# Patient Record
Sex: MALE | Race: WHITE | NOT HISPANIC OR LATINO | Employment: FULL TIME
[De-identification: names, ages, dates, MRNs, and addresses within clinical notes are randomized per-mention and may not be internally consistent; named-entity substitution may affect disease eponyms.]

---

## 2017-02-17 DIAGNOSIS — K21.9 GASTROESOPHAGEAL REFLUX DISEASE WITHOUT ESOPHAGITIS: ICD-10-CM

## 2017-02-17 NOTE — TELEPHONE ENCOUNTER
Ranitidine      Last Written Prescription Date: 02/16/16  Last Fill Quantity: 60,  # refills: 11   Last Office Visit with G, [facility] or [facility] prescribing provider: 02/16/16 Harriet Cadena PA-C

## 2017-02-21 NOTE — TELEPHONE ENCOUNTER
Medication is being filled for 1 time refill only due to:  Patient needs to be seen because it has been more than one year since last visit.   Mar Campo RN

## 2017-03-28 ENCOUNTER — RECORDS - HEALTHEAST (OUTPATIENT)
Dept: ADMINISTRATIVE | Facility: OTHER | Age: 47
End: 2017-03-28

## 2017-04-10 DIAGNOSIS — K21.9 GASTROESOPHAGEAL REFLUX DISEASE WITHOUT ESOPHAGITIS: ICD-10-CM

## 2017-04-10 NOTE — TELEPHONE ENCOUNTER
ranitidine (ZANTAC) 150 MG tablet      Last Written Prescription Date: 2/21/17  Last Fill Quantity: 60,  # refills: 0   Last Office Visit with [facility], [facility] or [facility] prescribing provider: 2/16/17    Elsie Aguilar

## 2017-04-12 NOTE — TELEPHONE ENCOUNTER
Trupti refill was given in February, patient is due for a follow up appointment (GERD, lipids) as they have not been seen in over a year. Please call the patient and assist with scheduling then route to the provider for refill review.    KING Sutherland, Clinical RN Marii Knox.

## 2017-04-12 NOTE — TELEPHONE ENCOUNTER
This writer attempted to contact Enrike on 04/12/17.    Was call answered?  No.  Left message on voicemail with information to call me back.    If patient calls back, please Schedule appointment as soon as possible for OV and fasting labs, then route to PCP.    Annel Walters

## 2017-04-17 DIAGNOSIS — E78.5 HYPERLIPIDEMIA LDL GOAL <130: ICD-10-CM

## 2017-04-17 RX ORDER — ATORVASTATIN CALCIUM 10 MG/1
10 TABLET, FILM COATED ORAL DAILY
Qty: 30 TABLET | Refills: 0 | Status: SHIPPED | OUTPATIENT
Start: 2017-04-17 | End: 2017-04-18

## 2017-04-17 NOTE — TELEPHONE ENCOUNTER
atorvastatin (LIPITOR) 10 MG tablet     Last Written Prescription Date: 6/1/16  Last Fill Quantity: 90, # refills: 2  Last Office Visit with G, P or [facility] prescribing provider: 2/16/16 Tammi  Next 5 appointments (look out 90 days)     Apr 20, 2017  8:20 AM CDT   [facility] Visit with Lisa Bravo MD   [facility]    [address] [place] [id number]   [phone]                   Lab Results   Component Value Date    CHOL 139 04/04/2016     Lab Results   Component Value Date    HDL 45 04/04/2016     Lab Results   Component Value Date    LDL 83 04/04/2016     Lab Results   Component Value Date    TRIG 54 04/04/2016     Lab Results   Component Value Date    CHOLHDLRATIO 5.2 08/19/2015     Mariann Aguilar

## 2017-04-17 NOTE — TELEPHONE ENCOUNTER
Medication is being filled for 1 time refill only due to:  Patient needs labs FLP.   Whit Alcala RN

## 2017-04-18 RX ORDER — ATORVASTATIN CALCIUM 10 MG/1
10 TABLET, FILM COATED ORAL DAILY
Qty: 30 TABLET | Refills: 0 | Status: SHIPPED | OUTPATIENT
Start: 2017-04-18 | End: 2017-04-20

## 2017-04-20 ENCOUNTER — OFFICE VISIT (OUTPATIENT)
Dept: FAMILY MEDICINE | Facility: CLINIC | Age: 47
End: 2017-04-20
Payer: COMMERCIAL

## 2017-04-20 VITALS
HEIGHT: 74 IN | WEIGHT: 215.2 LBS | DIASTOLIC BLOOD PRESSURE: 92 MMHG | HEART RATE: 72 BPM | OXYGEN SATURATION: 98 % | BODY MASS INDEX: 27.62 KG/M2 | SYSTOLIC BLOOD PRESSURE: 110 MMHG | TEMPERATURE: 98.7 F

## 2017-04-20 DIAGNOSIS — E78.5 HYPERLIPIDEMIA LDL GOAL <130: Primary | ICD-10-CM

## 2017-04-20 DIAGNOSIS — K21.9 GASTROESOPHAGEAL REFLUX DISEASE WITHOUT ESOPHAGITIS: ICD-10-CM

## 2017-04-20 DIAGNOSIS — Z82.49 FAMILY HISTORY OF BRAIN ANEURYSM: ICD-10-CM

## 2017-04-20 LAB
ALT SERPL W P-5'-P-CCNC: 43 U/L (ref 0–70)
CHOLEST SERPL-MCNC: 140 MG/DL
GLUCOSE SERPL-MCNC: 98 MG/DL (ref 70–99)
HDLC SERPL-MCNC: 52 MG/DL
LDLC SERPL CALC-MCNC: 66 MG/DL
NONHDLC SERPL-MCNC: 88 MG/DL
TRIGL SERPL-MCNC: 112 MG/DL

## 2017-04-20 PROCEDURE — 99214 OFFICE O/P EST MOD 30 MIN: CPT | Performed by: FAMILY MEDICINE

## 2017-04-20 PROCEDURE — 36415 COLL VENOUS BLD VENIPUNCTURE: CPT | Performed by: FAMILY MEDICINE

## 2017-04-20 PROCEDURE — 82947 ASSAY GLUCOSE BLOOD QUANT: CPT | Performed by: FAMILY MEDICINE

## 2017-04-20 PROCEDURE — 80061 LIPID PANEL: CPT | Performed by: FAMILY MEDICINE

## 2017-04-20 PROCEDURE — 84460 ALANINE AMINO (ALT) (SGPT): CPT | Performed by: FAMILY MEDICINE

## 2017-04-20 RX ORDER — ATORVASTATIN CALCIUM 10 MG/1
10 TABLET, FILM COATED ORAL DAILY
Qty: 90 TABLET | Refills: 3 | Status: SHIPPED | OUTPATIENT
Start: 2017-04-20 | End: 2018-03-14

## 2017-04-20 NOTE — NURSING NOTE
"Chief Complaint   Patient presents with     Heartburn       Initial BP (!) 132/92 (BP Location: Right arm, Patient Position: Chair, Cuff Size: Adult Regular)  Pulse 72  Temp 98.7  F (37.1  C) (Oral)  Ht 1.89 m (6' 2.41\")  Wt 97.6 kg (215 lb 3.2 oz)  SpO2 98%  BMI 27.33 kg/m2 Estimated body mass index is 27.33 kg/(m^2) as calculated from the following:    Height as of this encounter: 1.89 m (6' 2.41\").    Weight as of this encounter: 97.6 kg (215 lb 3.2 oz).  Medication Reconciliation: complete       Mendoza Laughlin CMA      "

## 2017-04-20 NOTE — PATIENT INSTRUCTIONS
Labs today.  Results to [facility].    Refills today.    Follow up for annual.  If you desire the MRI/MRA prior to annual, send a [facility] message.

## 2017-04-20 NOTE — MR AVS SNAPSHOT
After Visit Summary   4/20/2017    Enrike Ricci    MRN: [id number]           Patient Information     Date Of Birth          1970        Visit Information        Provider Department      4/20/2017 8:20 AM Lisa Bravo MD [facility]        Today's Diagnoses     Hyperlipidemia LDL goal <130    -  1    Gastroesophageal reflux disease without esophagitis        Family history of brain aneurysm          Care Instructions    Labs today.  Results to [facility].    Refills today.    Follow up for annual.  If you desire the MRI/MRA prior to annual, send a [facility] message.            Follow-ups after your visit        Who to contact     If you have questions or need follow up information about today's clinic visit or your schedule please contact [facility] directly at [phone].  Normal or non-critical lab and imaging results will be communicated to you by Svpplyhart, letter or phone within 4 business days after the clinic has received the results. If you do not hear from us within 7 days, please contact the clinic through [employer] or phone. If you have a critical or abnormal lab result, we will notify you by phone as soon as possible.  Submit refill requests through [employer] or call your pharmacy and they will forward the refill request to us. Please allow 3 business days for your refill to be completed.          Additional Information About Your Visit        SvpplyharRaftOut Information     [employer] gives you secure access to your electronic health record. If you see a primary care provider, you can also send messages to your care team and make appointments. If you have questions, please call your primary care clinic.  If you do not have a primary care provider, please call [phone] and they will assist you.        Care EveryWhere ID     This is your Care EveryWhere ID. This could be used by other organizations to access your [facility] medical records  [id number]        Your  "Vitals Were     Pulse Temperature Height Pulse Oximetry BMI (Body Mass Index)       72 98.7  F (37.1  C) (Oral) 1.89 m (6' 2.41\") 98% 27.33 kg/m2        Blood Pressure from Last 3 Encounters:   04/20/17 (!) 110/92   02/16/16 116/86   08/19/15 116/82    Weight from Last 3 Encounters:   04/20/17 97.6 kg (215 lb 3.2 oz)   02/16/16 98.9 kg (218 lb)   08/19/15 95.7 kg (211 lb)              We Performed the Following     ALT     Glucose     Lipid panel reflex to direct LDL          Today's Medication Changes          These changes are accurate as of: 4/20/17  9:04 AM.  If you have any questions, ask your nurse or doctor.               These medicines have changed or have updated prescriptions.        Dose/Directions    ranitidine 150 MG tablet   Commonly known as:  ZANTAC   This may have changed:  additional instructions   Used for:  Gastroesophageal reflux disease without esophagitis   Changed by:  Lisa Bravo MD        Take once or twice daily as needed   Quantity:  180 tablet   Refills:  1            Where to get your medicines      These medications were sent to [facility] [address] [place] [address] [facility] [place] [address]  [address] [facility] [address]     Phone:  [phone]     atorvastatin 10 MG tablet    ranitidine 150 MG tablet                Primary Care Provider Office Phone #    [facility] [phone]       No address on file        Thank you!     Thank you for choosing [facility]  for your care. Our goal is always to provide you with excellent care. Hearing back from our patients is one way we can continue to improve our services. Please take a few minutes to complete the written survey that you may receive in the mail after your visit with us. Thank you!             Your Updated Medication List - Protect others around you: Learn how to safely use, store and throw away your medicines at www.disposemymeds.org.          This " list is accurate as of: 4/20/17  9:04 AM.  Always use your most recent med list.                   Brand Name Dispense Instructions for use    aspirin 81 MG chewable tablet      Take 81 mg by mouth daily       atorvastatin 10 MG tablet    LIPITOR    90 tablet    Take 1 tablet (10 mg) by mouth daily       cholecalciferol 1000 UNIT tablet    vitamin D    100 tablet    Take 2 tablets (2,000 Units) by mouth daily       co-enzyme Q-10 50 MG Caps     90 capsule        niacin 500 MG tablet     180 tablet    Take by mouth daily (with breakfast)       omega 3 1000 MG Caps     90 capsule    Take 1 g by mouth daily       ranitidine 150 MG tablet    ZANTAC    180 tablet    Take once or twice daily as needed

## 2017-04-20 NOTE — PROGRESS NOTES
"  SUBJECTIVE:                                                    Enrike Ricci is a 47 year old male who presents to clinic today for the following health issues:      Hyperlipidemia Follow-Up      Rate your low fat/cholesterol diet?: good    Taking statin?  Yes, Pt was having muscle aches, but has started coq10 and has not had any since.    Other lipid medications/supplements?:  none       Amount of exercise or physical activity: 4-5 days/week for an average of 45-60 minutes    Problems taking medications regularly: No    Medication side effects: muscle aches    Diet: low fat/cholesterol    Medication Followup of zantac    Taking Medication as prescribed: yes    Side Effects:  None    Medication Helping Symptoms:  Yes, pt started taking 2 times a day again to help symptoms. Has also started using apple cider vinegar.     Random symptoms           Family history - cerebral aneurysm  - mother.      Problem list and histories reviewed & adjusted, as indicated.  Additional history: as documented    BP Readings from Last 3 Encounters:   04/20/17 (!) 110/92   02/16/16 116/86   08/19/15 116/82    Wt Readings from Last 3 Encounters:   04/20/17 97.6 kg (215 lb 3.2 oz)   02/16/16 98.9 kg (218 lb)   08/19/15 95.7 kg (211 lb)                    Reviewed and updated as needed this visit by clinical staff  Tobacco  Allergies  Meds  Med Hx  Surg Hx  Fam Hx  Soc Hx      Reviewed and updated as needed this visit by Provider  Tobacco  Allergies  Meds  Med Hx  Surg Hx  Fam Hx  Soc Hx        ROS:  Constitutional, HEENT, cardiovascular, pulmonary, gi and gu systems are negative, except as otherwise noted.    OBJECTIVE:                                                    BP (!) 110/92 (BP Location: Right arm, Patient Position: Chair, Cuff Size: Adult Regular)  Pulse 72  Temp 98.7  F (37.1  C) (Oral)  Ht 1.89 m (6' 2.41\")  Wt 97.6 kg (215 lb 3.2 oz)  SpO2 98%  BMI 27.33 kg/m2  Body mass index is 27.33 " "kg/(m^2).  GENERAL: alert, no distress and over weight  NECK: no adenopathy, no asymmetry, masses, or scars and thyroid normal to palpation  RESP: lungs clear to auscultation - no rales, rhonchi or wheezes  CV: regular rate and rhythm, normal S1 S2, no S3 or S4, no murmur, click or rub, no peripheral edema and peripheral pulses strong  ABDOMEN: soft, nontender, no hepatosplenomegaly, no masses and bowel sounds normal  MS: no gross musculoskeletal defects noted, no edema  NEURO: Normal strength and tone, sensory exam grossly normal, mentation intact, speech normal, cranial nerves 2-12 intact, gait normal including heel/toe/tandem walking and Romberg normal  BACK: no CVA tenderness, no paralumbar tenderness    Diagnostic Test Results:  Results for orders placed or performed in visit on 04/20/17   Lipid panel reflex to direct LDL   Result Value Ref Range    Cholesterol 140 <200 mg/dL    Triglycerides 112 <150 mg/dL    HDL Cholesterol 52 >39 mg/dL    LDL Cholesterol Calculated 66 <100 mg/dL    Non HDL Cholesterol 88 <130 mg/dL   ALT   Result Value Ref Range    ALT 43 0 - 70 U/L   Glucose   Result Value Ref Range    Glucose 98 70 - 99 mg/dL        ASSESSMENT/PLAN:                                                    Hyperlipidemia; controlled   Plan:  No changes in the patient's current treatment plan      BP Screening:   Last 3 BP Readings:    BP Readings from Last 3 Encounters:   04/20/17 (!) 110/92   02/16/16 116/86   08/19/15 116/82       The following was recommended to the patient:  Re-screen within 4 weeks and recommend lifestyle modifications  BMI:   Estimated body mass index is 27.33 kg/(m^2) as calculated from the following:    Height as of this encounter: 1.89 m (6' 2.41\").    Weight as of this encounter: 97.6 kg (215 lb 3.2 oz).   Weight management plan: Discussed healthy diet and exercise guidelines and patient will follow up in 6 months in clinic to re-evaluate.        2. Gastroesophageal reflux disease " without esophagitis  - ranitidine (ZANTAC) 150 MG tablet; Take once or twice daily as needed  Dispense: 180 tablet; Refill: 1    3. Family history of brain aneurysm  Discussed history - mother with catastrophic brain aneurysm in her 40's.  No other family members were testing but the neurosurgeon recommended they have testing done.  Patient is considering - changing jobs and will determine if best to do before job change or after.        Patient Instructions   Labs today.  Results to [facility].    Refills today.    Follow up for annual.  If you desire the MRI/MRA prior to annual, send a [facility] message.          Lisa rBavo MD  [facility]

## 2017-04-21 NOTE — PROGRESS NOTES
Your cholesterol levels are well controlled.  Your liver testing is normal.  Your blood sugar is normal.  Please call or MyChart message me if you have any questions.    PSK

## 2017-11-14 ENCOUNTER — OFFICE VISIT (OUTPATIENT)
Dept: FAMILY MEDICINE | Facility: CLINIC | Age: 47
End: 2017-11-14
Payer: COMMERCIAL

## 2017-11-14 VITALS
HEART RATE: 59 BPM | WEIGHT: 225 LBS | SYSTOLIC BLOOD PRESSURE: 120 MMHG | TEMPERATURE: 98.8 F | HEIGHT: 74 IN | BODY MASS INDEX: 28.88 KG/M2 | OXYGEN SATURATION: 98 % | DIASTOLIC BLOOD PRESSURE: 80 MMHG

## 2017-11-14 DIAGNOSIS — Z82.49 FAMILY HISTORY OF BRAIN ANEURYSM: ICD-10-CM

## 2017-11-14 DIAGNOSIS — E78.5 HYPERLIPIDEMIA LDL GOAL <130: ICD-10-CM

## 2017-11-14 DIAGNOSIS — M25.551 BILATERAL HIP PAIN: Primary | ICD-10-CM

## 2017-11-14 DIAGNOSIS — M25.552 BILATERAL HIP PAIN: Primary | ICD-10-CM

## 2017-11-14 DIAGNOSIS — M62.838 MUSCLE SPASM: ICD-10-CM

## 2017-11-14 PROCEDURE — 99214 OFFICE O/P EST MOD 30 MIN: CPT | Performed by: FAMILY MEDICINE

## 2017-11-14 ASSESSMENT — ANXIETY QUESTIONNAIRES
1. FEELING NERVOUS, ANXIOUS, OR ON EDGE: NOT AT ALL
6. BECOMING EASILY ANNOYED OR IRRITABLE: SEVERAL DAYS
IF YOU CHECKED OFF ANY PROBLEMS ON THIS QUESTIONNAIRE, HOW DIFFICULT HAVE THESE PROBLEMS MADE IT FOR YOU TO DO YOUR WORK, TAKE CARE OF THINGS AT HOME, OR GET ALONG WITH OTHER PEOPLE: SOMEWHAT DIFFICULT
3. WORRYING TOO MUCH ABOUT DIFFERENT THINGS: SEVERAL DAYS
GAD7 TOTAL SCORE: 5
7. FEELING AFRAID AS IF SOMETHING AWFUL MIGHT HAPPEN: NOT AT ALL
2. NOT BEING ABLE TO STOP OR CONTROL WORRYING: NOT AT ALL
5. BEING SO RESTLESS THAT IT IS HARD TO SIT STILL: SEVERAL DAYS

## 2017-11-14 ASSESSMENT — PATIENT HEALTH QUESTIONNAIRE - PHQ9
SUM OF ALL RESPONSES TO PHQ QUESTIONS 1-9: 4
5. POOR APPETITE OR OVEREATING: MORE THAN HALF THE DAYS

## 2017-11-14 NOTE — PATIENT INSTRUCTIONS
Take Ibuprofen 600mg three times daily for five days for antiinflammatory effect. Then use as needed. Take with food to prevent stomach irritation.     Apply gentle heat to alleviate pain. Prior to travel taking ibuprofen and topical pain patch maybe beneficial.     Continue doing stretches as usual, consider seeing physical therapy for additional monitored stretching.     Schedule an appointment for a yearly Physical exam at your earliest convenience.         At [facility], we strive to deliver an exceptional experience to you, every time we see you.  If you receive a survey in the mail, please send us back your thoughts. We really do value your feedback.    Based on your medical history, these are the current health maintenance/preventive care services that you are due for (some may have been done at this visit.)  Health Maintenance Due   Topic Date Due     INFLUENZA VACCINE (SYSTEM ASSIGNED)  09/01/2017         Suggested websites for health information:  Www.Digium.SupportPay : Up to date and easily searchable information on multiple topics.  Www.medlineplus.gov : medication info, interactive tutorials, watch real surgeries online  Www.familydoctor.org : good info from the Academy of Family Physicians  Www.cdc.gov : public health info, travel advisories, epidemics (H1N1)  Www.aap.org : children's health info, normal development, vaccinations  Www.health.[facility].[place].us : [place] dept of health, public health issues in [place], N1N1    Your care team:     Family Medicine   ASYA Rainey MD Emily Bunt, APRN CNP   S. MD Lisa Cunningham MD Angela Wermerskirchen, MD         Clinic hours: Monday - Wednesday 7 am-7 pm   Thursdays and Fridays 7 am-5 pm.     [facility]: Monday - Friday 11 am-9 pm,   Saturday and Sunday 9 am-5 pm.    [facility]: Monday -Thursday 8 am-8 pm; Friday 8 am-6 pm; Saturday and Sunday 9 am-5 pm.     [facility]: Monday Thursday 8 am   7 pm; Friday 8 am   6 pm    Clinic: [phone]   [facility]: [phone]   [facility]: [phone]

## 2017-11-14 NOTE — NURSING NOTE
"Chief Complaint   Patient presents with     Hip Pain       Initial /80 (BP Location: Right arm, Patient Position: Sitting, Cuff Size: Adult Regular)  Pulse 59  Temp 98.8  F (37.1  C) (Tympanic)  Ht 1.89 m (6' 2.41\")  Wt 102.1 kg (225 lb)  SpO2 98%  BMI 28.57 kg/m2 Estimated body mass index is 28.57 kg/(m^2) as calculated from the following:    Height as of this encounter: 1.89 m (6' 2.41\").    Weight as of this encounter: 102.1 kg (225 lb).  Medication Reconciliation: complete   Krysten Andrade MA      "

## 2017-11-14 NOTE — PROGRESS NOTES
SUBJECTIVE:   Enrike Ricci is a 47 year old male who presents to clinic today for the following health issues:      Hip Pain    Onset: Three months     Description:   Location: left hip and right hip  Character: Dull ache and Stabbing    Intensity: mild    Progression of Symptoms: worse, same, intermittent    Accompanying Signs & Symptoms:  Other symptoms: none    History:   Previous similar pain: no       Precipitating factors:   Trauma or overuse: no     Alleviating factors:  Improved by: stretching and standing     Therapies Tried and outcome: Tylenol           Hyperlipidemia Follow-Up      Rate your low fat/cholesterol diet?: good    Taking statin?  Yes, no muscle aches from statin    Other lipid medications/supplements?:  Niacin, without side effects    Fish oil/Omega 3,  without side effects    Hip Pain  Enrike comes in today for hip pain. He had this type of pain before, but not for this long duration of 6 months now.  He describes his hip pain is worse on the right side then on the left. Pain radiates down to the legs and groin area. Patient also feels soreness in the lower back, and tightness in the hip during rotary motions. In addition, at times he feels weakness in the hip. Hip pain is worse when sitting and upon getting up from sitting. However, pain is relieved when stretching and moving. Enrike goes to the gym 5 times a week for stretching/strengthing and cardio.  Denies edema, redness, or areas that are warm to touch. Patient takes one  tylenol with relief once a while. He also takes aspirin everyday 81mg unrelated to pain. He can tolerate ibuprofen.       Vitamin D  patient takes vitamin D regularly.      Problem list and histories reviewed & adjusted, as indicated.  Additional history: as documented    BP Readings from Last 3 Encounters:   11/14/17 120/80   04/20/17 (!) 110/92   02/16/16 116/86    Wt Readings from Last 3 Encounters:   11/14/17 102.1 kg (225 lb)   04/20/17 97.6 kg (215 lb  "3.2 oz)   02/16/16 98.9 kg (218 lb)                      Reviewed and updated as needed this visit by clinical staff  Tobacco  Allergies  Meds  Problems  Med Hx  Surg Hx  Fam Hx  Soc Hx        Reviewed and updated as needed this visit by Provider  Tobacco  Allergies  Meds  Problems  Med Hx  Surg Hx  Fam Hx  Soc Hx          ROS:  Constitutional, HEENT, cardiovascular, pulmonary, GI, , musculoskeletal, neuro, skin, endocrine and psych systems are negative, except as otherwise noted.    This document serves as a record of the services and decisions personally performed and made by Lisa Bravo MD. It was created on her behalf by Bolivar Marc, a trained medical scribe. The creation of this document is based on the provider's statements to the medical scribe.  Bolivar Marc 7:42 AM November 14, 2017      OBJECTIVE:   /80 (BP Location: Right arm, Patient Position: Sitting, Cuff Size: Adult Regular)  Pulse 59  Temp 98.8  F (37.1  C) (Tympanic)  Ht 1.89 m (6' 2.41\")  Wt 102.1 kg (225 lb)  SpO2 98%  BMI 28.57 kg/m2  Body mass index is 28.57 kg/(m^2).  GENERAL: alert, no distress, and overweight  NECK: no adenopathy, no asymmetry, masses, or scars and thyroid normal to palpation  RESP: lungs clear to auscultation - no rales, rhonchi or wheezes  CV: regular rate and rhythm, normal S1 S2, no S3 or S4, no murmur, click or rub, no peripheral edema and peripheral pulses strong  ABDOMEN: soft, nontender, no hepatosplenomegaly, no masses and bowel sounds normal  MS: negative straight leg raises no pain rotation of hips bilaterally. Mild limitation on extreme rotation and tight hamstrings bilaterally. no gross musculoskeletal defects noted, no edema  BACK: no CVA tenderness, no paralumbar tenderness           ASSESSMENT/PLAN:     BMI:   Estimated body mass index is 28.57 kg/(m^2) as calculated from the following:    Height as of this encounter: 1.89 m (6' 2.41\").    Weight as of this encounter: " 102.1 kg (225 lb).   Weight management plan: Discussed healthy diet and exercise guidelines and patient will follow up in 6 months in clinic to re-evaluate.          2. Bilateral hip pain/3. Muscle spasm  - appears to be muscular and not joint related.  Patient will take ibuprofen 600mg three times daily for five days for antiinflammatory effect. Afterwards, he will take as needed. Patient can use gentle heat and topical pain patches. He will continue doing stretched for relief, but if pain worsens he may schedule an appointment for physical therapy.   Planning trip to [place] - concerned about pain on travel (10 hour flight)         4. Family history of brain aneurysm  Continues to consider MRI screening due to mother's history.  Will discuss with next annual.     5. Hyperlipidemia LDL goal <130  Controlled - labs in spring.        Patient Instructions     Take Ibuprofen 600mg three times daily for five days for antiinflammatory effect. Then use as needed. Take with food to prevent stomach irritation.     Apply gentle heat to alleviate pain. Prior to travel taking ibuprofen and topical pain patch maybe beneficial.     Continue doing stretches as usual, consider seeing physical therapy for additional monitored stretching.     Schedule an appointment for a yearly Physical exam at your earliest convenience.               The information in this document, created by the medical scribe for me, accurately reflects the services I personally performed and the decisions made by me. I have reviewed and approved this document for accuracy prior to leaving the patient care area.  November 14, 2017 10:38 AM      Lisa Bravo MD  [facility]

## 2017-11-14 NOTE — MR AVS SNAPSHOT
After Visit Summary   11/14/2017    Enrike Ricci    MRN: [id number]           Patient Information     Date Of Birth          1970        Visit Information        Provider Department      11/14/2017 7:20 AM Lisa Bravo MD [facility]        Today's Diagnoses     Need for prophylactic vaccination and inoculation against influenza    -  1      Care Instructions    Take Ibuprofen 600mg three times daily for five days for antiinflammatory effect. Then use as needed. Take with food to prevent stomach irritation.     Apply gentle heat to alleviate pain. Prior to travel taking ibuprofen and topical pain patch maybe beneficial.     Continue doing stretches as usual, consider seeing physical therapy for additional monitored stretching.     Schedule an appointment for a yearly Physical exam at your earliest convenience.         At [facility], we strive to deliver an exceptional experience to you, every time we see you.  If you receive a survey in the mail, please send us back your thoughts. We really do value your feedback.    Based on your medical history, these are the current health maintenance/preventive care services that you are due for (some may have been done at this visit.)  Health Maintenance Due   Topic Date Due     INFLUENZA VACCINE (SYSTEM ASSIGNED)  09/01/2017         Suggested websites for health information:  Www.Involvio.Sensorion : Up to date and easily searchable information on multiple topics.  Www.medlineplus.gov : medication info, interactive tutorials, watch real surgeries online  Www.familydoctor.org : good info from the Academy of Family Physicians  Www.cdc.gov : public health info, travel advisories, epidemics (H1N1)  Www.aap.org : children's health info, normal development, vaccinations  Www.health.[facility].[place].us : [place] dept of health, public health issues in [place], N1N1    Your care team:     Family Medicine   ASYA Rainey  MD Katie Brown APRN CNP   S. MD Lisa Cunningham MD Angela Wermerskirchen, MD         Clinic hours: Monday - Wednesday 7 am-7 pm   Thursdays and Fridays 7 am-5 pm.     [facility]: Monday - Friday 11 am-9 pm,   Saturday and Sunday 9 am-5 pm.    [facility]: Monday -Thursday 8 am-8 pm; Friday 8 am-6 pm; Saturday and Sunday 9 am-5 pm.     [facility]: Monday - Thursday 8 am - 7 pm; Friday 8 am - 6 pm    Clinic: [phone]   [facility]: [phone]   [facility]: [phone]            Follow-ups after your visit        Who to contact     If you have questions or need follow up information about today's clinic visit or your schedule please contact [facility] directly at [phone].  Normal or non-critical lab and imaging results will be communicated to you by [employer], letter or phone within 4 business days after the clinic has received the results. If you do not hear from us within 7 days, please contact the clinic through [employer] or phone. If you have a critical or abnormal lab result, we will notify you by phone as soon as possible.  Submit refill requests through [employer] or call your pharmacy and they will forward the refill request to us. Please allow 3 business days for your refill to be completed.          Additional Information About Your Visit        [employer] Information     [employer] gives you secure access to your electronic health record. If you see a primary care provider, you can also send messages to your care team and make appointments. If you have questions, please call your primary care clinic.  If you do not have a primary care provider, please call [phone] and they will assist you.        Care EveryWhere ID     This is your Care EveryWhere ID. This could be used by other organizations to access your [place] medical records  [id number]        Your Vitals Were   "   Pulse Temperature Height Pulse Oximetry BMI (Body Mass Index)       59 98.8  F (37.1  C) (Tympanic) 1.89 m (6' 2.41\") 98% 28.57 kg/m2        Blood Pressure from Last 3 Encounters:   11/14/17 120/80   04/20/17 (!) 110/92   02/16/16 116/86    Weight from Last 3 Encounters:   11/14/17 102.1 kg (225 lb)   04/20/17 97.6 kg (215 lb 3.2 oz)   02/16/16 98.9 kg (218 lb)              Today, you had the following     No orders found for display       Primary Care Provider Office Phone # Fax #    [facility] [phone] [address] [facility] [address]        Equal Access to Services     JOHNIE CABRAL : Marjorie newbyo Sowilfredo, waaxda luqadaha, qaybta kaalmada adeegyaelan, abdi sosa. So [facility] [phone].    ATENCIÓN: Si habla español, tiene a montoya disposición servicios gratuitos de asistencia lingüística. Sebastián al [phone].    We comply with applicable federal civil rights laws and [place] laws. We do not discriminate on the basis of race, color, national origin, age, disability, sex, sexual orientation, or gender identity.            Thank you!     Thank you for choosing [facility]  for your care. Our goal is always to provide you with excellent care. Hearing back from our patients is one way we can continue to improve our services. Please take a few minutes to complete the written survey that you may receive in the mail after your visit with us. Thank you!             Your Updated Medication List - Protect others around you: Learn how to safely use, store and throw away your medicines at www.disposemymeds.org.          This list is accurate as of: 11/14/17  7:55 AM.  Always use your most recent med list.                   Brand Name Dispense Instructions for use Diagnosis    aspirin 81 MG chewable tablet      Take 81 mg by mouth daily        atorvastatin 10 MG tablet    LIPITOR    90 tablet    Take 1 tablet (10 mg) by mouth " daily    Hyperlipidemia LDL goal <130       cholecalciferol 1000 UNIT tablet    vitamin D3    100 tablet    Take 2 tablets (2,000 Units) by mouth daily    Vitamin D deficiency disease       co-enzyme Q-10 50 MG Caps     90 capsule         niacin 500 MG tablet     180 tablet    Take by mouth daily (with breakfast)        omega 3 1000 MG Caps     90 capsule    Take 1 g by mouth daily        ranitidine 150 MG tablet    ZANTAC    180 tablet    Take once or twice daily as needed    Gastroesophageal reflux disease without esophagitis

## 2017-11-15 ASSESSMENT — ANXIETY QUESTIONNAIRES: GAD7 TOTAL SCORE: 5

## 2018-03-12 ENCOUNTER — OFFICE VISIT (OUTPATIENT)
Dept: FAMILY MEDICINE | Facility: CLINIC | Age: 48
End: 2018-03-12
Payer: COMMERCIAL

## 2018-03-12 VITALS
DIASTOLIC BLOOD PRESSURE: 80 MMHG | TEMPERATURE: 98.3 F | HEIGHT: 74 IN | BODY MASS INDEX: 28.12 KG/M2 | HEART RATE: 68 BPM | SYSTOLIC BLOOD PRESSURE: 118 MMHG | OXYGEN SATURATION: 97 % | WEIGHT: 219.1 LBS

## 2018-03-12 DIAGNOSIS — E78.5 HYPERLIPIDEMIA LDL GOAL <130: ICD-10-CM

## 2018-03-12 DIAGNOSIS — K21.9 GASTROESOPHAGEAL REFLUX DISEASE WITHOUT ESOPHAGITIS: ICD-10-CM

## 2018-03-12 DIAGNOSIS — Z00.00 ROUTINE GENERAL MEDICAL EXAMINATION AT A HEALTH CARE FACILITY: Primary | ICD-10-CM

## 2018-03-12 DIAGNOSIS — Z82.49 FAMILY HISTORY OF BRAIN ANEURYSM: ICD-10-CM

## 2018-03-12 LAB
ALBUMIN SERPL-MCNC: 4.4 G/DL (ref 3.4–5)
ALP SERPL-CCNC: 69 U/L (ref 40–150)
ALT SERPL W P-5'-P-CCNC: 58 U/L (ref 0–70)
ANION GAP SERPL CALCULATED.3IONS-SCNC: 2 MMOL/L (ref 3–14)
AST SERPL W P-5'-P-CCNC: 33 U/L (ref 0–45)
BILIRUB SERPL-MCNC: 0.8 MG/DL (ref 0.2–1.3)
BUN SERPL-MCNC: 19 MG/DL (ref 7–30)
CALCIUM SERPL-MCNC: 9.3 MG/DL (ref 8.5–10.1)
CHLORIDE SERPL-SCNC: 107 MMOL/L (ref 94–109)
CHOLEST SERPL-MCNC: 149 MG/DL
CO2 SERPL-SCNC: 32 MMOL/L (ref 20–32)
CREAT SERPL-MCNC: 1.05 MG/DL (ref 0.66–1.25)
GFR SERPL CREATININE-BSD FRML MDRD: 75 ML/MIN/1.7M2
GLUCOSE SERPL-MCNC: 100 MG/DL (ref 70–99)
HDLC SERPL-MCNC: 56 MG/DL
HGB BLD-MCNC: 15.1 G/DL (ref 13.3–17.7)
LDLC SERPL CALC-MCNC: 74 MG/DL
NONHDLC SERPL-MCNC: 93 MG/DL
POTASSIUM SERPL-SCNC: 4.9 MMOL/L (ref 3.4–5.3)
PROT SERPL-MCNC: 7.8 G/DL (ref 6.8–8.8)
SODIUM SERPL-SCNC: 141 MMOL/L (ref 133–144)
TRIGL SERPL-MCNC: 94 MG/DL

## 2018-03-12 PROCEDURE — 99396 PREV VISIT EST AGE 40-64: CPT | Performed by: FAMILY MEDICINE

## 2018-03-12 PROCEDURE — 80053 COMPREHEN METABOLIC PANEL: CPT | Performed by: FAMILY MEDICINE

## 2018-03-12 PROCEDURE — 36415 COLL VENOUS BLD VENIPUNCTURE: CPT | Performed by: FAMILY MEDICINE

## 2018-03-12 PROCEDURE — 80061 LIPID PANEL: CPT | Performed by: FAMILY MEDICINE

## 2018-03-12 PROCEDURE — 85018 HEMOGLOBIN: CPT | Performed by: FAMILY MEDICINE

## 2018-03-12 NOTE — PROGRESS NOTES
SUBJECTIVE:   CC: Enrike Ricci is an 47 year old male who presents for preventative health visit.     Healthy Habits:    Do you get at least three servings of calcium containing foods daily (dairy, green leafy vegetables, etc.)? 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 Has 1 today    Do you see a dentist twice per year? yes    Do you have sleep apnea, excessive snoring or daytime drowsiness?no    Today's PHQ-2 Score:   PHQ-2 ( 1999 Pfizer) 3/12/2018 4/20/2017   Q1: Little interest or pleasure in doing things 0 1   Q2: Feeling down, depressed or hopeless 0 2   PHQ-2 Score 0 3     Abuse: Current or Past(Physical, Sexual or Emotional)- No  Do you feel safe in your environment - Yes    Social History   Substance Use Topics     Smoking status: Never Smoker     Smokeless tobacco: Never Used     Alcohol use No      If you drink alcohol do you typically have >3 drinks per day or >7 drinks per week? Not Applicable                      Last PSA: No results found for: PSA    Reviewed orders with patient. Reviewed health maintenance and updated orders accordingly - Yes  BP Readings from Last 3 Encounters:   03/12/18 118/80   11/14/17 120/80   04/20/17 (!) 110/92    Wt Readings from Last 3 Encounters:   03/12/18 99.4 kg (219 lb 1.6 oz)   11/14/17 102.1 kg (225 lb)   04/20/17 97.6 kg (215 lb 3.2 oz)         Hips  Patient states that his hips have improved since his last visit. His symptoms reoccur when he sits in one position for an extended period of time. He has an adjustable desk at work to help with this issue. Stands up and moves around when symptoms resurface.    Reflux  Patient states his symptoms for reflux is controlled at this point. He uses apple cider vinegar every night.  Occasional zantac use.  "    Reviewed and updated as needed this visit by clinical staff  Tobacco  Allergies  Meds  Med Hx  Surg Hx  Fam Hx  Soc Hx      Reviewed and updated as needed this visit by Provider  Tobacco  Allergies  Meds  Med Hx  Surg Hx  Fam Hx  Soc Hx       Past Medical History:   Diagnosis Date     H/O dysthymia       Past Surgical History:   Procedure Laterality Date     ARTHROSCOPY KNEE       EYE SURGERY Left as a child     ROS:  10 point ROS of systems including Constitutional, Eyes, Respiratory, Cardiovascular, Gastroenterology, Genitourinary, Integumentary, Muscularskeletal, Psychiatric were all negative except for pertinent positives noted in my HPI.    This document serves as a record of the services and decisions personally performed and made by Lisa Bravo MD. It was created on her behalf by ERIC SAMPSON, a trained medical scribe. The creation of this document is based on the provider's statements to the medical scribe.  ERIC SAMPSON 10:00 AM March 12, 2018    OBJECTIVE:   /80 (BP Location: Right arm, Patient Position: Chair, Cuff Size: Adult Regular)  Pulse 68  Temp 98.3  F (36.8  C) (Oral)  Ht 1.879 m (6' 1.98\")  Wt 99.4 kg (219 lb 1.6 oz)  SpO2 97%  BMI 28.15 kg/m2  EXAM:  GENERAL: alert, no distress and over weight  EYES: Eyes grossly normal to inspection, PERRL and conjunctivae and sclerae normal  HENT: ear canals and TM's normal, nose and mouth without ulcers or lesions  NECK: no adenopathy, no asymmetry, masses, or scars and thyroid normal to palpation  RESP: lungs clear to auscultation - no rales, rhonchi or wheezes  CV: regular rate and rhythm, normal S1 S2, no S3 or S4, no murmur, click or rub, no peripheral edema and peripheral pulses strong  ABDOMEN: soft, nontender, no hepatosplenomegaly, no masses and bowel sounds normal   (male): normal male genitalia without lesions or urethral discharge, no hernia  MS: no gross musculoskeletal defects noted, no edema  SKIN: no " "suspicious lesions or rashes  NEURO: Normal strength and tone, mentation intact and speech normal  PSYCH: mentation appears normal, affect normal/bright    ASSESSMENT/PLAN:   1. Routine general medical examination at a health care facility  Fasting labs.    - Hemoglobin  - Comprehensive metabolic panel    2. Family history of brain aneurysm  Discussed again family history of fatal aneurysm in mother age 49.  Her physician recommended the family have screening.  He has not yet had this done.  Considering.  Order placed for the testing for him   - MRA Angiogram Brain & MRI Brain w/o Cont; Future    3. Hyperlipidemia LDL goal <130  Well controlled.  Continue current treatment  - Lipid panel reflex to direct LDL Fasting  - atorvastatin (LIPITOR) 10 MG tablet; Take 1 tablet (10 mg) by mouth daily  Dispense: 90 tablet; Refill: 3    4. Gastroesophageal reflux disease without esophagitis  Prn use.    - ranitidine (ZANTAC) 150 MG tablet; Take once or twice daily as needed  Dispense: 180 tablet; Refill: 1      COUNSELING:  Reviewed preventive health counseling, as reflected in patient instructions     reports that he has never smoked. He has never used smokeless tobacco.    Estimated body mass index is 28.15 kg/(m^2) as calculated from the following:    Height as of this encounter: 1.879 m (6' 1.98\").    Weight as of this encounter: 99.4 kg (219 lb 1.6 oz).   Weight management plan: Discussed healthy diet and exercise guidelines and patient will follow up in 12 months in clinic to re-evaluate.    Counseling Resources:  ATP IV Guidelines  Pooled Cohorts Equation Calculator  FRAX Risk Assessment  ICSI Preventive Guidelines  Dietary Guidelines for Americans, 2010  USDA's MyPlate  ASA Prophylaxis  Lung CA Screening    The information in this document, created by the medical scribe for me, accurately reflects the services I personally performed and the decisions made by me. I have reviewed and approved this document for accuracy " prior to leaving the patient care area.  March 12, 2018 10:24 AM    Lisa Bravo MD  [facility]    Patient Instructions   Fasting labs today. I will send you the results via [employer] and refill your medications when I receive them.    Schedule MRI screening. You can call [phone] to schedule this at the [facility] in [place] (formerly the [facility]).

## 2018-03-12 NOTE — MR AVS SNAPSHOT
After Visit Summary   3/12/2018    Enrike Ricci    MRN: [id number]           Patient Information     Date Of Birth          1970        Visit Information        Provider Department      3/12/2018 10:00 AM Lisa Bravo MD [facility]        Today's Diagnoses     Routine general medical examination at a health care facility    -  1    Family history of brain aneurysm        Hyperlipidemia LDL goal <130        Gastroesophageal reflux disease without esophagitis          Care Instructions    Fasting labs today. I will send you the results via [employer] and refill your medications when I receive them.    Schedule MRI screening. You can call [phone] to schedule this at the [facility] in [place] (formerly the [facility]).    Preventive Health Recommendations  Male Ages 40 to 49    Yearly exam:             See your health care provider every year in order to  o   Review health changes.   o   Discuss preventive care.    o   Review your medicines if your doctor has prescribed any.    You should be tested each year for STDs (sexually transmitted diseases) if you re at risk.     Have a cholesterol test every 5 years.     Have a colonoscopy (test for colon cancer) if someone in your family has had colon cancer or polyps before age 50.     After age 45, have a diabetes test (fasting glucose). If you are at risk for diabetes, you should have this test every 3 years.      Talk with your health care provider about whether or not a prostate cancer screening test (PSA) is right for you.    Shots: Get a flu shot each year. Get a tetanus shot every 10 years.     Nutrition:    Eat at least 5 servings of fruits and vegetables daily.     Eat whole-grain bread, whole-wheat pasta and brown rice instead of white grains and rice.     Talk to your provider about Calcium and Vitamin D.     Lifestyle    Exercise for at least 150 minutes a week (30 minutes a  "day, 5 days a week). This will help you control your weight and prevent disease.     Limit alcohol to one drink per day.     No smoking.     Wear sunscreen to prevent skin cancer.     See your dentist every six months for an exam and cleaning.              Follow-ups after your visit        Future tests that were ordered for you today     Open Future Orders        Priority Expected Expires Ordered    MRA Angiogram Brain & MRI Brain w/o Cont Routine  3/12/2019 3/12/2018            Who to contact     If you have questions or need follow up information about today's clinic visit or your schedule please contact [facility] directly at [phone].  Normal or non-critical lab and imaging results will be communicated to you by MyChart, letter or phone within 4 business days after the clinic has received the results. If you do not hear from us within 7 days, please contact the clinic through [employer] or phone. If you have a critical or abnormal lab result, we will notify you by phone as soon as possible.  Submit refill requests through [employer] or call your pharmacy and they will forward the refill request to us. Please allow 3 business days for your refill to be completed.          Additional Information About Your Visit        "[employer] Information     [employer] gives you secure access to your electronic health record. If you see a primary care provider, you can also send messages to your care team and make appointments. If you have questions, please call your primary care clinic.  If you do not have a primary care provider, please call [phone] and they will assist you.        Care EveryWhere ID     This is your Care EveryWhere ID. This could be used by other organizations to access your [place] medical records  [id number]        Your Vitals Were     Pulse Temperature Height Pulse Oximetry BMI (Body Mass Index)       68 98.3  F (36.8  C) (Oral) 1.879 m (6' 1.98\") 97% 28.15 kg/m2        Blood Pressure " from Last 3 Encounters:   03/12/18 118/80   11/14/17 120/80   04/20/17 (!) 110/92    Weight from Last 3 Encounters:   03/12/18 99.4 kg (219 lb 1.6 oz)   11/14/17 102.1 kg (225 lb)   04/20/17 97.6 kg (215 lb 3.2 oz)              We Performed the Following     Comprehensive metabolic panel     Hemoglobin     Lipid panel reflex to direct LDL Fasting        Primary Care Provider Office Phone # Fax #    [facility] [phone] [address] [facility] [address]        Equal Access to Services     PAULA CABRAL : Hadii lisa Dejesus, waaxda lumeredithadaha, qaybta kaalmada ademia, abdi rivas . So [facility] [phone].    ATENCIÓN: Si habla español, tiene a montoya disposición servicios gratuitos de asistencia lingüística. Llame al [phone].    We comply with applicable federal civil rights laws and [place] laws. We do not discriminate on the basis of race, color, national origin, age, disability, sex, sexual orientation, or gender identity.            Thank you!     Thank you for choosing [facility]  for your care. Our goal is always to provide you with excellent care. Hearing back from our patients is one way we can continue to improve our services. Please take a few minutes to complete the written survey that you may receive in the mail after your visit with us. Thank you!             Your Updated Medication List - Protect others around you: Learn how to safely use, store and throw away your medicines at www.disposemymeds.org.          This list is accurate as of 3/12/18 10:25 AM.  Always use your most recent med list.                   Brand Name Dispense Instructions for use Diagnosis    aspirin 81 MG chewable tablet      Take 81 mg by mouth daily        atorvastatin 10 MG tablet    LIPITOR    90 tablet    Take 1 tablet (10 mg) by mouth daily    Hyperlipidemia LDL goal <130       cholecalciferol 1000 UNIT tablet    vitamin D3     100 tablet    Take 2 tablets (2,000 Units) by mouth daily    Vitamin D deficiency disease       co-enzyme Q-10 50 MG Caps     90 capsule         niacin 500 MG tablet     180 tablet    Take by mouth daily (with breakfast)        omega 3 1000 MG Caps     90 capsule    Take 1 g by mouth daily        ranitidine 150 MG tablet    ZANTAC    180 tablet    Take once or twice daily as needed    Gastroesophageal reflux disease without esophagitis

## 2018-03-12 NOTE — PATIENT INSTRUCTIONS
Fasting labs today. I will send you the results via [employer] and refill your medications when I receive them.    Schedule MRI screening. You can call [phone] to schedule this at the [facility] in [place] (formerly the [facility]).    Preventive Health Recommendations  Male Ages 40 to 49    Yearly exam:             See your health care provider every year in order to  o   Review health changes.   o   Discuss preventive care.    o   Review your medicines if your doctor has prescribed any.    You should be tested each year for STDs (sexually transmitted diseases) if you re at risk.     Have a cholesterol test every 5 years.     Have a colonoscopy (test for colon cancer) if someone in your family has had colon cancer or polyps before age 50.     After age 45, have a diabetes test (fasting glucose). If you are at risk for diabetes, you should have this test every 3 years.      Talk with your health care provider about whether or not a prostate cancer screening test (PSA) is right for you.    Shots: Get a flu shot each year. Get a tetanus shot every 10 years.     Nutrition:    Eat at least 5 servings of fruits and vegetables daily.     Eat whole-grain bread, whole-wheat pasta and brown rice instead of white grains and rice.     Talk to your provider about Calcium and Vitamin D.     Lifestyle    Exercise for at least 150 minutes a week (30 minutes a day, 5 days a week). This will help you control your weight and prevent disease.     Limit alcohol to one drink per day.     No smoking.     Wear sunscreen to prevent skin cancer.     See your dentist every six months for an exam and cleaning.

## 2018-03-14 RX ORDER — ATORVASTATIN CALCIUM 10 MG/1
10 TABLET, FILM COATED ORAL DAILY
Qty: 90 TABLET | Refills: 3 | Status: SHIPPED | OUTPATIENT
Start: 2018-03-14 | End: 2018-05-15

## 2018-03-14 NOTE — PROGRESS NOTES
"Your cholesterol is under good control.   Your blood sugar is borderline elevated (1 point high).  This is in the \"prediabetic\" range.  Exercise and limiting carbohydrate and sugars in diet can be helpful at preventing progression to diabetes.  Your kidney and liver testing are normal.    Your hemoglobin is normal.  Please call or MyChart message me if you have any questions.    PSK  "

## 2018-04-06 ENCOUNTER — RADIANT APPOINTMENT (OUTPATIENT)
Dept: MRI IMAGING | Facility: CLINIC | Age: 48
End: 2018-04-06
Attending: FAMILY MEDICINE
Payer: COMMERCIAL

## 2018-04-06 DIAGNOSIS — Z82.49 FAMILY HISTORY OF BRAIN ANEURYSM: ICD-10-CM

## 2018-04-06 PROCEDURE — 70551 MRI BRAIN STEM W/O DYE: CPT | Performed by: RADIOLOGY

## 2018-04-06 PROCEDURE — 70544 MR ANGIOGRAPHY HEAD W/O DYE: CPT | Mod: 59 | Performed by: RADIOLOGY

## 2018-04-07 NOTE — PROGRESS NOTES
Your MRI is normal.  There is no aneurysm or other abnormality in the brain noted.  Please call or MyChart message me if you have any questions.   NAZIA

## 2018-05-15 DIAGNOSIS — E78.5 HYPERLIPIDEMIA LDL GOAL <130: ICD-10-CM

## 2018-05-15 RX ORDER — ATORVASTATIN CALCIUM 10 MG/1
TABLET, FILM COATED ORAL
Qty: 90 TABLET | Refills: 2 | Status: SHIPPED | OUTPATIENT
Start: 2018-05-15 | End: 2019-04-07

## 2018-05-15 NOTE — TELEPHONE ENCOUNTER
"Requested Prescriptions   Pending Prescriptions Disp Refills     atorvastatin (LIPITOR) 10 MG tablet [Pharmacy Med Name: ATORVASTATIN 10MG TABLETS]  Last Written Prescription Date:  03/14/18  Last Fill Quantity: 90 tablet,  # refills: 3  Last office visit: 3/12/2018 with prescribing provider:  Dr. Bravo  Future Office Visit:   90 tablet 0     Sig: TAKE 1 TABLET(10 MG) BY MOUTH DAILY    Statins Protocol Passed    5/15/2018  9:19 AM       Passed - LDL on file in past 12 months    Recent Labs   Lab Test  03/12/18   1012   LDL  74            Passed - No abnormal creatine kinase in past 12 months    No lab results found.            Passed - Recent (12 mo) or future (30 days) visit within the authorizing provider's specialty    Patient had office visit in the last 12 months or has a visit in the next 30 days with authorizing provider or within the authorizing provider's specialty.  See \"Patient Info\" tab in inbasket, or \"Choose Columns\" in Meds & Orders section of the refill encounter.           Passed - Patient is age 18 or older          "

## 2019-02-07 ENCOUNTER — ANCILLARY PROCEDURE (OUTPATIENT)
Dept: GENERAL RADIOLOGY | Facility: CLINIC | Age: 49
End: 2019-02-07
Attending: FAMILY MEDICINE
Payer: COMMERCIAL

## 2019-02-07 ENCOUNTER — OFFICE VISIT (OUTPATIENT)
Dept: FAMILY MEDICINE | Facility: CLINIC | Age: 49
End: 2019-02-07
Payer: COMMERCIAL

## 2019-02-07 VITALS
BODY MASS INDEX: 27.72 KG/M2 | OXYGEN SATURATION: 99 % | HEART RATE: 75 BPM | DIASTOLIC BLOOD PRESSURE: 80 MMHG | HEIGHT: 74 IN | TEMPERATURE: 98.1 F | SYSTOLIC BLOOD PRESSURE: 108 MMHG | WEIGHT: 216 LBS

## 2019-02-07 DIAGNOSIS — R53.83 OTHER FATIGUE: ICD-10-CM

## 2019-02-07 DIAGNOSIS — R93.1 ELEVATED CORONARY ARTERY CALCIUM SCORE: ICD-10-CM

## 2019-02-07 DIAGNOSIS — R05.9 COUGH: ICD-10-CM

## 2019-02-07 DIAGNOSIS — R05.9 COUGH: Primary | ICD-10-CM

## 2019-02-07 DIAGNOSIS — F41.9 ANXIETY: ICD-10-CM

## 2019-02-07 LAB
ERYTHROCYTE [DISTWIDTH] IN BLOOD BY AUTOMATED COUNT: 12.3 % (ref 10–15)
HCT VFR BLD AUTO: 40.4 % (ref 40–53)
HETEROPH AB SER QL: NEGATIVE
HGB BLD-MCNC: 14.5 G/DL (ref 13.3–17.7)
MCH RBC QN AUTO: 30.2 PG (ref 26.5–33)
MCHC RBC AUTO-ENTMCNC: 35.9 G/DL (ref 31.5–36.5)
MCV RBC AUTO: 84 FL (ref 78–100)
PLATELET # BLD AUTO: 177 10E9/L (ref 150–450)
RBC # BLD AUTO: 4.8 10E12/L (ref 4.4–5.9)
WBC # BLD AUTO: 5.6 10E9/L (ref 4–11)

## 2019-02-07 PROCEDURE — 36415 COLL VENOUS BLD VENIPUNCTURE: CPT | Performed by: FAMILY MEDICINE

## 2019-02-07 PROCEDURE — 71046 X-RAY EXAM CHEST 2 VIEWS: CPT | Mod: FY

## 2019-02-07 PROCEDURE — 85027 COMPLETE CBC AUTOMATED: CPT | Performed by: FAMILY MEDICINE

## 2019-02-07 PROCEDURE — 86308 HETEROPHILE ANTIBODY SCREEN: CPT | Performed by: FAMILY MEDICINE

## 2019-02-07 PROCEDURE — 99215 OFFICE O/P EST HI 40 MIN: CPT | Performed by: FAMILY MEDICINE

## 2019-02-07 RX ORDER — GUAIFENESIN 600 MG/1
600 TABLET, EXTENDED RELEASE ORAL 2 TIMES DAILY
Qty: 30 TABLET | Refills: 0 | Status: SHIPPED | OUTPATIENT
Start: 2019-02-07 | End: 2019-06-05

## 2019-02-07 RX ORDER — BENZONATATE 100 MG/1
100 CAPSULE ORAL 3 TIMES DAILY PRN
Qty: 30 CAPSULE | Refills: 0 | Status: SHIPPED | OUTPATIENT
Start: 2019-02-07 | End: 2019-06-05

## 2019-02-07 ASSESSMENT — ANXIETY QUESTIONNAIRES
1. FEELING NERVOUS, ANXIOUS, OR ON EDGE: SEVERAL DAYS
3. WORRYING TOO MUCH ABOUT DIFFERENT THINGS: SEVERAL DAYS
GAD7 TOTAL SCORE: 7
6. BECOMING EASILY ANNOYED OR IRRITABLE: SEVERAL DAYS
IF YOU CHECKED OFF ANY PROBLEMS ON THIS QUESTIONNAIRE, HOW DIFFICULT HAVE THESE PROBLEMS MADE IT FOR YOU TO DO YOUR WORK, TAKE CARE OF THINGS AT HOME, OR GET ALONG WITH OTHER PEOPLE: SOMEWHAT DIFFICULT
7. FEELING AFRAID AS IF SOMETHING AWFUL MIGHT HAPPEN: SEVERAL DAYS
2. NOT BEING ABLE TO STOP OR CONTROL WORRYING: SEVERAL DAYS
5. BEING SO RESTLESS THAT IT IS HARD TO SIT STILL: SEVERAL DAYS

## 2019-02-07 ASSESSMENT — PAIN SCALES - GENERAL: PAINLEVEL: NO PAIN (0)

## 2019-02-07 ASSESSMENT — PATIENT HEALTH QUESTIONNAIRE - PHQ9
SUM OF ALL RESPONSES TO PHQ QUESTIONS 1-9: 6
5. POOR APPETITE OR OVEREATING: SEVERAL DAYS

## 2019-02-07 ASSESSMENT — MIFFLIN-ST. JEOR: SCORE: 1913.52

## 2019-02-07 NOTE — PROGRESS NOTES
SUBJECTIVE:   Enrike Ricci is a 48 year old male who presents to clinic today for the following health issues:    Acute Illness   Acute illness concerns: Cough and SOB  Onset: December 2018, 4-5 weeks duration so far    Fever: YES but had food poisoning this weekend     Chills/Sweats: YES- with food poisoning     Headache (location?): no     Sinus Pressure:YES- a little    Conjunctivitis:  no    Ear Pain: no    Rhinorrhea: No    Congestion: no     Sore Throat: no      Cough: YES-productive of clear sputum    Wheeze: YES    Decreased Appetite: YES    Nausea: no     Vomiting: no     Diarrhea:  YES- food poisoning     Dysuria/Freq.: no     Fatigue/Achiness: YES- very fatigued    Sick/Strep Exposure: YES     Therapies Tried and outcome: Theraflu in December with some relief     He did have a case of classic food poisoning the end of last weekend. He was throwing up through the night and then miserable the next day, then got over it.     This cold started as a chest cold, and it did get better for a time, but it has dragged on in this state of non-completion for some time now. He will get short of breath walking up stairs, and more time than usual to recover from his kickboxing class. Cough is not worse at night or during the day. No ringing in the ears.  A friend of his got something similar, but he lost his voice and has a paralyzed vocal chord that they are not sure will come back fully. He is concerned that something like this could happen to him if he doesn't take care of this.   He does note that he has a very busy job and lifestyle.       Anxiety:  Tends to have anxiety.  Has past history of depression.  No current depression symptoms that he notes but very anxious about health.  Specifically anxious about heart health.  PHQ-9 SCORE 2/16/2016 11/14/2017 2/7/2019   PHQ-9 Total Score - - -   PHQ-9 Total Score 7 4 6     BHARGAVI-7 SCORE 2/16/2016 11/14/2017 2/7/2019   Total Score - - -   Total Score 2 5 7        Elevated coronary artery calcium score:  Uncertain what the number was but was elevated about 10 years ago.  Underwent stress testing - normal.  We do not have access to the records from the calcium score testing but patient has copy at home that he can scan for sending to me over [employer].       Problem list and histories reviewed & adjusted, as indicated.  Additional history: as documented    Patient Active Problem List   Diagnosis     CARDIOVASCULAR SCREENING; LDL GOAL LESS THAN 160     Dysthymia     GERD (gastroesophageal reflux disease)     Vitamin D deficiency disease     Family history of brain aneurysm     Hyperlipidemia LDL goal <130     Past Surgical History:   Procedure Laterality Date     ARTHROSCOPY KNEE       EYE SURGERY Left as a child       Social History     Tobacco Use     Smoking status: Never Smoker     Smokeless tobacco: Never Used   Substance Use Topics     Alcohol use: No     Family History   Problem Relation Age of Onset     Aneurysm Mother 49     Esophageal Cancer Father 64     Cancer Maternal Grandmother 48         Current Outpatient Medications   Medication Sig Dispense Refill     aspirin 81 MG chewable tablet Take 81 mg by mouth daily       atorvastatin (LIPITOR) 10 MG tablet TAKE 1 TABLET(10 MG) BY MOUTH DAILY 90 tablet 2     benzonatate (TESSALON) 100 MG capsule Take 1 capsule (100 mg) by mouth 3 times daily as needed for cough 30 capsule 0     cholecalciferol (VITAMIN D) 1000 UNIT tablet Take 2 tablets (2,000 Units) by mouth daily 100 tablet 3     co-enzyme Q-10 50 MG CAPS  90 capsule      guaiFENesin (MUCINEX) 600 MG 12 hr tablet Take 1 tablet (600 mg) by mouth 2 times daily 30 tablet 0     niacin 500 MG tablet Take by mouth daily (with breakfast) 180 tablet      omega 3 1000 MG CAPS Take 1 g by mouth daily 90 capsule      ranitidine (ZANTAC) 150 MG tablet Take once or twice daily as needed 180 tablet 1     Allergies   Allergen Reactions     Cats Other (See Comments) and Itching  "    sneezing     Strawberry Other (See Comments)     Hives       BP Readings from Last 3 Encounters:   02/07/19 108/80   03/12/18 118/80   11/14/17 120/80    Wt Readings from Last 3 Encounters:   02/07/19 98 kg (216 lb)   03/12/18 99.4 kg (219 lb 1.6 oz)   11/14/17 102.1 kg (225 lb)         Labs reviewed in EPIC    Reviewed and updated as needed this visit by clinical staff  Tobacco  Allergies  Meds  Med Hx  Surg Hx  Fam Hx  Soc Hx      Reviewed and updated as needed this visit by Provider  Tobacco  Allergies  Meds  Med Hx  Surg Hx  Fam Hx  Soc Hx      ROS:  Constitutional, HEENT, cardiovascular, pulmonary, GI, , musculoskeletal, neuro, skin, endocrine and psych systems are negative, except as otherwise noted.    This document serves as a record of the services and decisions personally performed and made by Lisa Bravo MD. It was created on her behalf by Katie Villa, a trained medical scribe. The creation of this document is based the provider's statements to the medical scribe.  Katie Villa February 7, 2019 9:38 AM      OBJECTIVE:   /80 (BP Location: Right arm, Patient Position: Chair, Cuff Size: Adult Large)   Pulse 75   Temp 98.1  F (36.7  C) (Oral)   Ht 1.87 m (6' 1.62\")   Wt 98 kg (216 lb)   SpO2 99%   BMI 28.02 kg/m    Body mass index is 28.02 kg/m .  GENERAL: healthy, alert and no distress, overweight  EYES: Eyes grossly normal to inspection, PERRL and conjunctivae and sclerae normal  HENT: ear canals and TM's normal, nose and mouth without ulcers or lesions, small amount of clear post nasal drainage.  NECK: no adenopathy, no asymmetry, masses, or scars and thyroid normal to palpation  RESP: lungs clear to auscultation - no rales, rhonchi or wheezes  CV: regular rate and rhythm, normal S1 S2, no S3 or S4, no murmur, click or rub, no peripheral edema and peripheral pulses strong  ABDOMEN: soft, nontender, no hepatosplenomegaly, no masses and bowel sounds normal  MS: no gross " musculoskeletal defects noted, no edema  SKIN: no suspicious lesions or rashes  NEURO: Normal strength and tone, mentation intact and speech normal  PSYCH: mentation appears normal, affect normal/bright, anxious    Diagnostic Test Results:    A 2-view Chest X-Ray was ordered. My reading of this film is unremarkable. (No comparison films available: pending review by Radiologist.)    Results for orders placed or performed in visit on 02/07/19 (from the past 24 hour(s))   CBC with platelets   Result Value Ref Range    WBC 5.6 4.0 - 11.0 10e9/L    RBC Count 4.80 4.4 - 5.9 10e12/L    Hemoglobin 14.5 13.3 - 17.7 g/dL    Hematocrit 40.4 40.0 - 53.0 %    MCV 84 78 - 100 fl    MCH 30.2 26.5 - 33.0 pg    MCHC 35.9 31.5 - 36.5 g/dL    RDW 12.3 10.0 - 15.0 %    Platelet Count PENDING 150 - 450 10e9/L   Mononucleosis screen   Result Value Ref Range    Mononucleosis Screen Negative NEG^Negative       ASSESSMENT/PLAN:     1. Cough    Both exam, labs and xray are negative for more serious conditions, likely a viral illness, or compounded viral illnesses. Prescription given for tessalon to help with the cough. Advised to push fluids, get plenty of rest and try to reduce stress, practice proper hygeine to prevent additional illnesses from occurring. Mucinex 600 mg twice a day for mucolytic effect.  - XR Chest 2 Views; Future  - CBC with platelets  - benzonatate (TESSALON) 100 MG capsule; Take 1 capsule (100 mg) by mouth 3 times daily as needed for cough  Dispense: 30 capsule; Refill: 0  - guaiFENesin (MUCINEX) 600 MG 12 hr tablet; Take 1 tablet (600 mg) by mouth 2 times daily  Dispense: 30 tablet; Refill: 0    2. Other fatigue  See above. Negative for mono today. Fatigue likely related to illness, stress, lack of sleep.  Additional evaluation if symptoms persist   - CBC with platelets  - Mononucleosis screen    3. Anxiety  We further discussed the underlying anxiety that is affecting his life at this point, and reviewed options for  treatment of this problem, including starting medications. We talked about starting on Buspar, however he would like to think about this more, and research it on his own. He has had negative side effects to antidepressant medication in the past which affected him greatly, so he would prefer not to start a medication if he can work through this on his own.     4. Elevated coronary artery calcium score  He has some anxiety concerning the health of his heart, especially given his family history of early death from noncardiac complications. I reviewed with him the results of his stress echocardiogram. His results from this test at that time were reassuring.  He will forward copy of the calcium score result and additional recommendations can be made after review.      Patient Instructions   Your exam today is reassuring that this is just a viral illness. Mono test was negative, as was your chest XR and blood test. WIll let you know when the full results of the blood test are in.     Push fluids, get plenty of rest and try to reduce stress. Practice proper hygeine to prevent additional illnesses from occurring. You can use the Tessalon up to 3 times a day for cough. Can try some Mucinex OTC as well.     Send a My Chart update in 48 hours with your symptoms if they are worsening or improving.     Consider restarting on anxiety medications. Notify me if you would like to start this. The medication we discussed to day was Buspar if you want to research it yourself.       The information in this document, created by the medical scribe for me, accurately reflects the services I personally performed and the decisions made by me. I have reviewed and approved this document for accuracy.     Lisa Bravo MD  [facility]      Total time:  42 min,  Counseling time:  Greater than 50% of total time with reference to the above noted symptoms.

## 2019-02-07 NOTE — PATIENT INSTRUCTIONS
Your exam today is reassuring that this is just a viral illness. Mono test was negative, as was your chest XR and blood test. WIll let you know when the full results of the blood test are in.     Push fluids, get plenty of rest and try to reduce stress. Practice proper hygeine to prevent additional illnesses from occurring. You can use the Tessalon up to 3 times a day for cough. Can try some Mucinex OTC as well.     Send a My Chart update in 48 hours with your symptoms if they are worsening or improving.     Consider restarting on anxiety medications. Notify me if you would like to start this. The medication we discussed to day was Buspar if you want to research it yourself.

## 2019-02-07 NOTE — PROGRESS NOTES
The 10-year ASCVD risk score (Olivia MORGAN Jr., et al., 2013) is: 1.2%    Values used to calculate the score:      Age: 48 years      Sex: Male      Is Non- : No      Diabetic: No      Tobacco smoker: No      Systolic Blood Pressure: 108 mmHg      Is BP treated: No      HDL Cholesterol: 56 mg/dL      Total Cholesterol: 149 mg/dL

## 2019-02-08 ENCOUNTER — MYC MEDICAL ADVICE (OUTPATIENT)
Dept: FAMILY MEDICINE | Facility: CLINIC | Age: 49
End: 2019-02-08

## 2019-02-08 DIAGNOSIS — E78.5 HYPERLIPIDEMIA LDL GOAL <130: Primary | ICD-10-CM

## 2019-02-08 ASSESSMENT — ANXIETY QUESTIONNAIRES: GAD7 TOTAL SCORE: 7

## 2019-03-10 DIAGNOSIS — K21.9 GASTROESOPHAGEAL REFLUX DISEASE WITHOUT ESOPHAGITIS: ICD-10-CM

## 2019-03-11 NOTE — TELEPHONE ENCOUNTER
"Requested Prescriptions   Pending Prescriptions Disp Refills     ranitidine (ZANTAC) 150 MG tablet [Pharmacy Med Name: RANITIDINE 150MG TABLETS] 180 tablet 0     Sig: TAKE 1 TABLET BY MOUTH ONCE OR TWICE DAILY AS NEEDED    H2 Blockers Protocol Passed - 3/10/2019  2:02 PM       Passed - Patient is age 12 or older       Passed - Recent (12 mo) or future (30 days) visit within the authorizing provider's specialty    Patient had office visit in the last 12 months or has a visit in the next 30 days with authorizing provider or within the authorizing provider's specialty.  See \"Patient Info\" tab in inbasket, or \"Choose Columns\" in Meds & Orders section of the refill encounter.             Passed - Medication is active on med list        ranitidine (ZANTAC) 150 MG tablet  Last Written Prescription Date:  3/14/18  Last Fill Quantity: 180,  # refills: 1   Last office visit: 2/7/2019 with prescribing provider:  Dr. Bravo   Future Office Visit:      "

## 2019-03-12 NOTE — TELEPHONE ENCOUNTER
Prescription approved per [facility] [place].      Rafael Ba RN, BSN

## 2019-04-07 DIAGNOSIS — E78.5 HYPERLIPIDEMIA LDL GOAL <130: ICD-10-CM

## 2019-04-08 NOTE — TELEPHONE ENCOUNTER
Routing refill request to provider for review/approval because:  Labs not current:  LDL  A break in medication    Cally Zhu RN

## 2019-04-08 NOTE — TELEPHONE ENCOUNTER
"Requested Prescriptions   Pending Prescriptions Disp Refills     atorvastatin (LIPITOR) 10 MG tablet [Pharmacy Med Name: ATORVASTATIN 10MG TABLETS] 90 tablet 0     Sig: TAKE 1 TABLET(10 MG) BY MOUTH DAILY       Statins Protocol Failed - 4/7/2019 11:55 AM        Failed - LDL on file in past 12 months     Recent Labs   Lab Test 03/12/18  1012   LDL 74             Passed - No abnormal creatine kinase in past 12 months     No lab results found.             Passed - Recent (12 mo) or future (30 days) visit within the authorizing provider's specialty     Patient had office visit in the last 12 months or has a visit in the next 30 days with authorizing provider or within the authorizing provider's specialty.  See \"Patient Info\" tab in inbasket, or \"Choose Columns\" in Meds & Orders section of the refill encounter.              Passed - Medication is active on med list        Passed - Patient is age 18 or older        atorvastatin (LIPITOR) 10 MG tablet  Last Written Prescription Date:  5/15/18  Last Fill Quantity: 90,  # refills: 2   Last office visit: 2/7/2019 with prescribing provider:  Dr. Bravo   Future Office Visit:      "

## 2019-04-09 RX ORDER — ATORVASTATIN CALCIUM 10 MG/1
10 TABLET, FILM COATED ORAL DAILY
Qty: 90 TABLET | Refills: 0 | Status: SHIPPED | OUTPATIENT
Start: 2019-04-09 | End: 2019-06-05

## 2019-04-09 NOTE — TELEPHONE ENCOUNTER
The 10-year ASCVD risk score (Olivia MORGAN Jr., et al., 2013) is: 1.3%    Values used to calculate the score:      Age: 49 years      Sex: Male      Is Non- : No      Diabetic: No      Tobacco smoker: No      Systolic Blood Pressure: 108 mmHg      Is BP treated: No      HDL Cholesterol: 56 mg/dL      Total Cholesterol: 149 mg/dL

## 2019-05-24 DIAGNOSIS — E78.5 HYPERLIPIDEMIA LDL GOAL <130: ICD-10-CM

## 2019-05-24 LAB
CHOLEST SERPL-MCNC: 180 MG/DL
HDLC SERPL-MCNC: 52 MG/DL
LDLC SERPL CALC-MCNC: 111 MG/DL
NONHDLC SERPL-MCNC: 128 MG/DL
TRIGL SERPL-MCNC: 86 MG/DL

## 2019-05-24 PROCEDURE — 36415 COLL VENOUS BLD VENIPUNCTURE: CPT | Performed by: FAMILY MEDICINE

## 2019-05-24 PROCEDURE — 80061 LIPID PANEL: CPT | Performed by: FAMILY MEDICINE

## 2019-05-24 NOTE — RESULT ENCOUNTER NOTE
Narda Bravo is out of the office and I am reviewing your results.   It looks like your cholesterol is up a bit from a year ago but still in the acceptable range.  I see that you have an upcoming appointment with Dr. Bravo.  Keep that appointment as scheduled.   Please call or MyChart my office with any questions or concerns.    Elsie Hsu, PAC

## 2019-06-04 ENCOUNTER — MYC MEDICAL ADVICE (OUTPATIENT)
Dept: FAMILY MEDICINE | Facility: CLINIC | Age: 49
End: 2019-06-04

## 2019-06-04 ASSESSMENT — ENCOUNTER SYMPTOMS
DIZZINESS: 0
ABDOMINAL PAIN: 0
NAUSEA: 0
EYE PAIN: 0
DIARRHEA: 0
PARESTHESIAS: 0
ARTHRALGIAS: 0
SORE THROAT: 0
HEADACHES: 0
PALPITATIONS: 0
HEMATOCHEZIA: 0
FEVER: 0
CHILLS: 0
HEARTBURN: 0
DYSURIA: 0
HEMATURIA: 0
NERVOUS/ANXIOUS: 0
WEAKNESS: 0
JOINT SWELLING: 0
SHORTNESS OF BREATH: 0
COUGH: 0
MYALGIAS: 0
FREQUENCY: 0

## 2019-06-04 ASSESSMENT — PATIENT HEALTH QUESTIONNAIRE - PHQ9
SUM OF ALL RESPONSES TO PHQ QUESTIONS 1-9: 9
SUM OF ALL RESPONSES TO PHQ QUESTIONS 1-9: 9
10. IF YOU CHECKED OFF ANY PROBLEMS, HOW DIFFICULT HAVE THESE PROBLEMS MADE IT FOR YOU TO DO YOUR WORK, TAKE CARE OF THINGS AT HOME, OR GET ALONG WITH OTHER PEOPLE: SOMEWHAT DIFFICULT

## 2019-06-05 ENCOUNTER — OFFICE VISIT (OUTPATIENT)
Dept: FAMILY MEDICINE | Facility: CLINIC | Age: 49
End: 2019-06-05
Payer: COMMERCIAL

## 2019-06-05 VITALS
DIASTOLIC BLOOD PRESSURE: 81 MMHG | BODY MASS INDEX: 28.18 KG/M2 | HEIGHT: 74 IN | SYSTOLIC BLOOD PRESSURE: 125 MMHG | WEIGHT: 219.6 LBS | OXYGEN SATURATION: 95 % | TEMPERATURE: 98.6 F | RESPIRATION RATE: 20 BRPM | HEART RATE: 68 BPM

## 2019-06-05 DIAGNOSIS — Z11.4 ENCOUNTER FOR SCREENING FOR HIV: ICD-10-CM

## 2019-06-05 DIAGNOSIS — R20.2 NUMBNESS AND TINGLING OF BOTH FEET: ICD-10-CM

## 2019-06-05 DIAGNOSIS — Z00.00 ROUTINE GENERAL MEDICAL EXAMINATION AT A HEALTH CARE FACILITY: Primary | ICD-10-CM

## 2019-06-05 DIAGNOSIS — E78.5 HYPERLIPIDEMIA LDL GOAL <130: ICD-10-CM

## 2019-06-05 DIAGNOSIS — R20.0 NUMBNESS AND TINGLING OF BOTH FEET: ICD-10-CM

## 2019-06-05 DIAGNOSIS — R53.83 OTHER FATIGUE: ICD-10-CM

## 2019-06-05 DIAGNOSIS — K21.9 GASTROESOPHAGEAL REFLUX DISEASE WITHOUT ESOPHAGITIS: ICD-10-CM

## 2019-06-05 LAB
ALBUMIN SERPL-MCNC: 4.6 G/DL (ref 3.4–5)
ALBUMIN UR-MCNC: NEGATIVE MG/DL
ALP SERPL-CCNC: 73 U/L (ref 40–150)
ALT SERPL W P-5'-P-CCNC: 51 U/L (ref 0–70)
ANION GAP SERPL CALCULATED.3IONS-SCNC: 4 MMOL/L (ref 3–14)
APPEARANCE UR: CLEAR
AST SERPL W P-5'-P-CCNC: 30 U/L (ref 0–45)
BILIRUB SERPL-MCNC: 0.8 MG/DL (ref 0.2–1.3)
BILIRUB UR QL STRIP: NEGATIVE
BUN SERPL-MCNC: 11 MG/DL (ref 7–30)
CALCIUM SERPL-MCNC: 9.4 MG/DL (ref 8.5–10.1)
CHLORIDE SERPL-SCNC: 107 MMOL/L (ref 94–109)
CO2 SERPL-SCNC: 30 MMOL/L (ref 20–32)
COLOR UR AUTO: YELLOW
CREAT SERPL-MCNC: 0.94 MG/DL (ref 0.66–1.25)
ERYTHROCYTE [DISTWIDTH] IN BLOOD BY AUTOMATED COUNT: 12.7 % (ref 10–15)
GFR SERPL CREATININE-BSD FRML MDRD: >90 ML/MIN/{1.73_M2}
GLUCOSE SERPL-MCNC: 104 MG/DL (ref 70–99)
GLUCOSE UR STRIP-MCNC: NEGATIVE MG/DL
HCT VFR BLD AUTO: 43.2 % (ref 40–53)
HGB BLD-MCNC: 15.1 G/DL (ref 13.3–17.7)
HGB UR QL STRIP: NEGATIVE
KETONES UR STRIP-MCNC: NEGATIVE MG/DL
LEUKOCYTE ESTERASE UR QL STRIP: NEGATIVE
MCH RBC QN AUTO: 29.9 PG (ref 26.5–33)
MCHC RBC AUTO-ENTMCNC: 35 G/DL (ref 31.5–36.5)
MCV RBC AUTO: 86 FL (ref 78–100)
NITRATE UR QL: NEGATIVE
PH UR STRIP: 6.5 PH (ref 5–7)
PLATELET # BLD AUTO: 164 10E9/L (ref 150–450)
POTASSIUM SERPL-SCNC: 4.5 MMOL/L (ref 3.4–5.3)
PROT SERPL-MCNC: 8 G/DL (ref 6.8–8.8)
RBC # BLD AUTO: 5.05 10E12/L (ref 4.4–5.9)
SODIUM SERPL-SCNC: 141 MMOL/L (ref 133–144)
SOURCE: NORMAL
SP GR UR STRIP: 1.01 (ref 1–1.03)
TSH SERPL DL<=0.005 MIU/L-ACNC: 1.75 MU/L (ref 0.4–4)
UROBILINOGEN UR STRIP-ACNC: 0.2 EU/DL (ref 0.2–1)
VIT B12 SERPL-MCNC: 1036 PG/ML (ref 193–986)
WBC # BLD AUTO: 4.7 10E9/L (ref 4–11)

## 2019-06-05 PROCEDURE — 84443 ASSAY THYROID STIM HORMONE: CPT | Performed by: FAMILY MEDICINE

## 2019-06-05 PROCEDURE — 81003 URINALYSIS AUTO W/O SCOPE: CPT | Performed by: FAMILY MEDICINE

## 2019-06-05 PROCEDURE — 87389 HIV-1 AG W/HIV-1&-2 AB AG IA: CPT | Performed by: FAMILY MEDICINE

## 2019-06-05 PROCEDURE — 82306 VITAMIN D 25 HYDROXY: CPT | Performed by: FAMILY MEDICINE

## 2019-06-05 PROCEDURE — 99213 OFFICE O/P EST LOW 20 MIN: CPT | Mod: 25 | Performed by: FAMILY MEDICINE

## 2019-06-05 PROCEDURE — 80053 COMPREHEN METABOLIC PANEL: CPT | Performed by: FAMILY MEDICINE

## 2019-06-05 PROCEDURE — 99396 PREV VISIT EST AGE 40-64: CPT | Performed by: FAMILY MEDICINE

## 2019-06-05 PROCEDURE — 36415 COLL VENOUS BLD VENIPUNCTURE: CPT | Performed by: FAMILY MEDICINE

## 2019-06-05 PROCEDURE — 82607 VITAMIN B-12: CPT | Performed by: FAMILY MEDICINE

## 2019-06-05 PROCEDURE — 85027 COMPLETE CBC AUTOMATED: CPT | Performed by: FAMILY MEDICINE

## 2019-06-05 RX ORDER — ATORVASTATIN CALCIUM 10 MG/1
10 TABLET, FILM COATED ORAL DAILY
Qty: 90 TABLET | Refills: 3 | Status: SHIPPED | OUTPATIENT
Start: 2019-06-05 | End: 2020-06-18

## 2019-06-05 ASSESSMENT — PATIENT HEALTH QUESTIONNAIRE - PHQ9
SUM OF ALL RESPONSES TO PHQ QUESTIONS 1-9: 9
5. POOR APPETITE OR OVEREATING: MORE THAN HALF THE DAYS

## 2019-06-05 ASSESSMENT — ENCOUNTER SYMPTOMS
NERVOUS/ANXIOUS: 0
PARESTHESIAS: 0
DIARRHEA: 0
HEMATURIA: 0
JOINT SWELLING: 0
DYSURIA: 0
ARTHRALGIAS: 0
COUGH: 0
DIZZINESS: 0
HEMATOCHEZIA: 0
SHORTNESS OF BREATH: 0
HEARTBURN: 0
FEVER: 0
FREQUENCY: 0
WEAKNESS: 0
SORE THROAT: 0
EYE PAIN: 0
MYALGIAS: 0
ABDOMINAL PAIN: 0
HEADACHES: 0
NAUSEA: 0
CHILLS: 0
PALPITATIONS: 0

## 2019-06-05 ASSESSMENT — ANXIETY QUESTIONNAIRES
GAD7 TOTAL SCORE: 7
2. NOT BEING ABLE TO STOP OR CONTROL WORRYING: NOT AT ALL
6. BECOMING EASILY ANNOYED OR IRRITABLE: MORE THAN HALF THE DAYS
1. FEELING NERVOUS, ANXIOUS, OR ON EDGE: SEVERAL DAYS
7. FEELING AFRAID AS IF SOMETHING AWFUL MIGHT HAPPEN: NOT AT ALL
5. BEING SO RESTLESS THAT IT IS HARD TO SIT STILL: SEVERAL DAYS
3. WORRYING TOO MUCH ABOUT DIFFERENT THINGS: SEVERAL DAYS
IF YOU CHECKED OFF ANY PROBLEMS ON THIS QUESTIONNAIRE, HOW DIFFICULT HAVE THESE PROBLEMS MADE IT FOR YOU TO DO YOUR WORK, TAKE CARE OF THINGS AT HOME, OR GET ALONG WITH OTHER PEOPLE: SOMEWHAT DIFFICULT

## 2019-06-05 ASSESSMENT — PAIN SCALES - GENERAL: PAINLEVEL: NO PAIN (0)

## 2019-06-05 ASSESSMENT — MIFFLIN-ST. JEOR: SCORE: 1926.88

## 2019-06-05 NOTE — PROGRESS NOTES
SUBJECTIVE:   CC: Enrike Ricci is an 49 year old male who presents for preventative health visit.     Healthy Habits:     Getting at least 3 servings of Calcium per day:  Yes    Bi-annual eye exam:  Yes    Dental care twice a year:  Yes    Sleep apnea or symptoms of sleep apnea:  None    Diet:  Regular (no restrictions)    Frequency of exercise:  2-3 days/week    Duration of exercise:  30-45 minutes    Taking medications regularly:  Yes    Medication side effects:  None    PHQ-2 Total Score: 4    Additional concerns today:  Yes    kickboxing, karate class          When sitting for long periods heels will go numb. Lower back will have a bruised feeling.  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  Chiropractor treatment without improvement.  Traveling for work, more stress seems to be associated.  Symptoms resolve with activity.      Hyperlipidemia Follow-Up      Are you having any of the following symptoms? (Select all that apply)  No complaints of shortness of breath, chest pain or pressure.  No increased sweating or nausea with activity.  No left-sided neck or arm pain.  No complaints of pain in calves when walking 1-2 blocks.    Are you regularly taking any medication or supplement to lower your cholesterol?   Yes- lipitor    Are you having muscle aches or other side effects that you think could be caused by your cholesterol lowering medication?  No    GERD  -  Symptoms controlled with zantac daily at night.  No other concerns. No swallowing difficulty.      Today's PHQ-2 Score:   PHQ-2 ( 1999 [employer]) 6/4/2019   Q1: Little interest or pleasure in doing things 2   Q2: Feeling down, depressed or hopeless 2   PHQ-2 Score 4   Q1: Little interest or pleasure in doing things More than half the days   Q2: Feeling down, depressed or hopeless More than half the days   PHQ-2 Score 4       Abuse: Current or Past(Physical, Sexual or Emotional)- No  Do you feel safe in your environment? No    Social History     Tobacco Use      Smoking status: Never Smoker     Smokeless tobacco: Never Used   Substance Use Topics     Alcohol use: No         Alcohol Use 6/4/2019   Prescreen: >3 drinks/day or >7 drinks/week? No       Last PSA: No results found for: PSA    Reviewed orders with patient. Reviewed health maintenance and updated orders accordingly - Yes  BP Readings from Last 3 Encounters:   06/05/19 125/81   02/07/19 108/80   03/12/18 118/80    Wt Readings from Last 3 Encounters:   06/05/19 99.6 kg (219 lb 9.6 oz)   02/07/19 98 kg (216 lb)   03/12/18 99.4 kg (219 lb 1.6 oz)                    Reviewed and updated as needed this visit by clinical staff  Tobacco  Allergies  Meds  Med Hx  Surg Hx  Fam Hx  Soc Hx        Reviewed and updated as needed this visit by Provider  Tobacco  Allergies  Meds  Med Hx  Surg Hx  Fam Hx  Soc Hx       Past Medical History:   Diagnosis Date     H/O dysthymia       Past Surgical History:   Procedure Laterality Date     ARTHROSCOPY KNEE       EYE SURGERY Left as a child       Review of Systems   Constitutional: Negative for chills and fever.   HENT: Negative for congestion, ear pain, hearing loss and sore throat.    Eyes: Negative for pain and visual disturbance.   Respiratory: Negative for cough and shortness of breath.    Cardiovascular: Negative for chest pain, palpitations and peripheral edema.   Gastrointestinal: Negative for abdominal pain, diarrhea, heartburn, hematochezia and nausea.   Genitourinary: Negative for discharge, dysuria, frequency, genital sores, hematuria, impotence and urgency.   Musculoskeletal: Negative for arthralgias, joint swelling and myalgias.   Skin: Negative for rash.   Neurological: Negative for dizziness, weakness, headaches and paresthesias.   Psychiatric/Behavioral: Negative for mood changes. The patient is not nervous/anxious.      Mild focus issues, energy low.    Has taken wellbutrin in past without bad side effects and with benefit.    OBJECTIVE:   /81 (BP  "Location: Right arm, Patient Position: Sitting, Cuff Size: Adult Regular)   Pulse 68   Temp 98.6  F (37  C) (Oral)   Resp 20   Ht 1.873 m (6' 1.75\")   Wt 99.6 kg (219 lb 9.6 oz)   SpO2 95%   BMI 28.39 kg/m      Physical Exam  GENERAL: healthy, alert and no distress  EYES: Eyes grossly normal to inspection, PERRL and conjunctivae and sclerae normal  HENT: ear canals and TM's normal, nose and mouth without ulcers or lesions  NECK: no adenopathy, no asymmetry, masses, or scars and thyroid normal to palpation  RESP: lungs clear to auscultation - no rales, rhonchi or wheezes  CV: regular rate and rhythm, normal S1 S2, no S3 or S4, no murmur, click or rub, no peripheral edema and peripheral pulses strong  ABDOMEN: soft, nontender, no hepatosplenomegaly, no masses and bowel sounds normal   (male): normal male genitalia without lesions or urethral discharge, no hernia  MS: no gross musculoskeletal defects noted, no edema  SKIN: no suspicious lesions or rashes  NEURO: Normal strength and tone, mentation intact and speech normal  PSYCH: mentation appears normal, affect normal/bright  LYMPH: no cervical, supraclavicular, axillary, or inguinal adenopathy  Foot exam: normal DP and PT pulses, no trophic changes or ulcerative lesions, normal sensory exam and normal monofilament exam    Diagnostic Test Results:  Labs reviewed in Epic  Results for orders placed or performed in visit on 06/05/19 (from the past 24 hour(s))   CBC with platelets   Result Value Ref Range    WBC 4.7 4.0 - 11.0 10e9/L    RBC Count 5.05 4.4 - 5.9 10e12/L    Hemoglobin 15.1 13.3 - 17.7 g/dL    Hematocrit 43.2 40.0 - 53.0 %    MCV 86 78 - 100 fl    MCH 29.9 26.5 - 33.0 pg    MCHC 35.0 31.5 - 36.5 g/dL    RDW 12.7 10.0 - 15.0 %    Platelet Count 164 150 - 450 10e9/L   Comprehensive metabolic panel   Result Value Ref Range    Sodium 141 133 - 144 mmol/L    Potassium 4.5 3.4 - 5.3 mmol/L    Chloride 107 94 - 109 mmol/L    Carbon Dioxide 30 20 - 32 " mmol/L    Anion Gap 4 3 - 14 mmol/L    Glucose 104 (H) 70 - 99 mg/dL    Urea Nitrogen 11 7 - 30 mg/dL    Creatinine 0.94 0.66 - 1.25 mg/dL    GFR Estimate >90 >60 mL/min/[1.73_m2]    GFR Estimate If Black >90 >60 mL/min/[1.73_m2]    Calcium 9.4 8.5 - 10.1 mg/dL    Bilirubin Total 0.8 0.2 - 1.3 mg/dL    Albumin 4.6 3.4 - 5.0 g/dL    Protein Total 8.0 6.8 - 8.8 g/dL    Alkaline Phosphatase 73 40 - 150 U/L    ALT 51 0 - 70 U/L    AST 30 0 - 45 U/L   TSH with free T4 reflex   Result Value Ref Range    TSH 1.75 0.40 - 4.00 mU/L   Vitamin B12   Result Value Ref Range    Vitamin B12 1,036 (H) 193 - 986 pg/mL   *UA reflex to Microscopic   Result Value Ref Range    Color Urine Yellow     Appearance Urine Clear     Glucose Urine Negative NEG^Negative mg/dL    Bilirubin Urine Negative NEG^Negative    Ketones Urine Negative NEG^Negative mg/dL    Specific Gravity Urine 1.015 1.003 - 1.035    Blood Urine Negative NEG^Negative    pH Urine 6.5 5.0 - 7.0 pH    Protein Albumin Urine Negative NEG^Negative mg/dL    Urobilinogen Urine 0.2 0.2 - 1.0 EU/dL    Nitrite Urine Negative NEG^Negative    Leukocyte Esterase Urine Negative NEG^Negative    Source Midstream Urine        ASSESSMENT/PLAN:   1. Routine general medical examination at a health care facility  Fasting.  - *UA reflex to Microscopic  - CBC with platelets  - Comprehensive metabolic panel    2. Hyperlipidemia LDL goal <130  Refill medication.    - atorvastatin (LIPITOR) 10 MG tablet; Take 1 tablet (10 mg) by mouth daily  Dispense: 90 tablet; Refill: 3    3. Gastroesophageal reflux disease without esophagitis  Continue use as previous.   - ranitidine (ZANTAC) 150 MG tablet; TAKE 1 TABLET BY MOUTH ONCE OR TWICE DAILY AS NEEDED  Dispense: 180 tablet; Refill: 1    4. Other fatigue  If normal labs, I would recommend a trial of wellbutrin - has taken this previously with good results on mood.   - CBC with platelets  - Comprehensive metabolic panel  - TSH with free T4 reflex  -  "Vitamin D Deficiency  - OFFICE/OUTPT VISIT,EST,LEVL III    5. Numbness and tingling of both feet  Normal initial labs   - TSH with free T4 reflex  - Vitamin B12  - OFFICE/OUTPT VISIT,EST,LEVL III    6. Encounter for screening for HIV  pending  - HIV Screening    COUNSELING:   Reviewed preventive health counseling, as reflected in patient instructions       Regular exercise       Healthy diet/nutrition       HIV screeninx in teen years, 1x in adult years, and at intervals if high risk       Colon cancer screening       Prostate cancer screening       Osteoporosis Prevention/Bone Health    Estimated body mass index is 28.39 kg/m  as calculated from the following:    Height as of this encounter: 1.873 m (6' 1.75\").    Weight as of this encounter: 99.6 kg (219 lb 9.6 oz).     Weight management plan: Discussed healthy diet and exercise guidelines     reports that he has never smoked. He has never used smokeless tobacco.      Counseling Resources:  ATP IV Guidelines  Pooled Cohorts Equation Calculator  FRAX Risk Assessment  ICSI Preventive Guidelines  Dietary Guidelines for Americans,   [employer] MD's MyPlate  ASA Prophylaxis  Lung CA Screening    Lisa Bravo MD  [facility]  Answers for HPI/ROS submitted by the patient on 2019   Annual Exam:  If you checked off any problems, how difficult have these problems made it for you to do your work, take care of things at home, or get along with other people?: Somewhat difficult  PHQ9 TOTAL SCORE: 9      Patient Instructions   Labs today.  Results with recommendations for symptoms to mychart when available.    If heel numbness worsens, follow up is recommended.  Labs for evaluation of symptoms today.          "

## 2019-06-05 NOTE — PATIENT INSTRUCTIONS
Labs today.  Results with recommendations for symptoms to mychart when available.    If heel numbness worsens, follow up is recommended.  Labs for evaluation of symptoms today.

## 2019-06-06 ENCOUNTER — MYC MEDICAL ADVICE (OUTPATIENT)
Dept: FAMILY MEDICINE | Facility: CLINIC | Age: 49
End: 2019-06-06

## 2019-06-06 LAB
DEPRECATED CALCIDIOL+CALCIFEROL SERPL-MC: 38 UG/L (ref 20–75)
HIV 1+2 AB+HIV1 P24 AG SERPL QL IA: NONREACTIVE

## 2019-06-06 ASSESSMENT — ANXIETY QUESTIONNAIRES: GAD7 TOTAL SCORE: 7

## 2019-06-06 NOTE — RESULT ENCOUNTER NOTE
"Your urine testing is normal.  Your B 12 level is not low - actually it is high but that is not associated with any health concerns.  Your thyroid testing is normal.  Your blood sugar is borderline elevated.  This is in the \"prediabetic\" range.  Exercise and limiting carbohydrate and sugars in diet can be helpful at preventing progression to diabetes.  Your liver and kidney testing and blood cell counts are all normal.  Please call or MyChart message me if you have any questions.     PSK"

## 2019-06-06 NOTE — RESULT ENCOUNTER NOTE
Narda Bravo is out of the office and I am reviewing your results.   Your HIV test was negative.   Your vitamin D level was normal.   Please call or MyChart my office with any questions or concerns.    Elsie Hsu, PAC

## 2019-08-22 DIAGNOSIS — F33.0 MILD EPISODE OF RECURRENT MAJOR DEPRESSIVE DISORDER (H): ICD-10-CM

## 2019-08-22 NOTE — TELEPHONE ENCOUNTER
"Requested Prescriptions   Pending Prescriptions Disp Refills     buPROPion (WELLBUTRIN XL) 150 MG 24 hr tablet [Pharmacy Med Name: BUPROPION XL 150MG TABLETS (24 H)] 30 tablet 0     Sig: TAKE 1 TABLET(150 MG) BY MOUTH EVERY MORNING       SSRIs Protocol Failed - 8/22/2019  3:27 AM        Failed - PHQ-9 score less than 5 in past 6 months     Please review last PHQ-9 score.           Passed - Medication is Bupropion     If the medication is Bupropion (Wellbutrin), and the patient is taking for smoking cessation; OK to refill.          Passed - Medication is active on med list        Passed - Patient is age 18 or older        Passed - Recent (6 mo) or future (30 days) visit within the authorizing provider's specialty     Patient had office visit in the last 6 months or has a visit in the next 30 days with authorizing provider or within the authorizing provider's specialty.  See \"Patient Info\" tab in inbasket, or \"Choose Columns\" in Meds & Orders section of the refill encounter.            buPROPion (WELLBUTRIN XL) 150 MG 24 hr tablet  Last Written Prescription Date:  6/27/19  Last Fill Quantity: 30,  # refills: 1   Last office visit: 6/5/2019 with prescribing provider:  Dr. Bravo   Future Office Visit:      PHQ-9 score:    PHQ-9 SCORE 6/5/2019   PHQ-9 Total Score -   PHQ-9 Total Score Ariashart -   PHQ-9 Total Score 9             BHARGAVI-7 SCORE 11/14/2017 2/7/2019 6/5/2019   Total Score - - -   Total Score 5 7 7         "

## 2019-08-23 RX ORDER — BUPROPION HYDROCHLORIDE 150 MG/1
150 TABLET ORAL EVERY MORNING
Qty: 30 TABLET | Refills: 0 | Status: SHIPPED | OUTPATIENT
Start: 2019-08-23 | End: 2019-09-28

## 2019-09-13 ENCOUNTER — OFFICE VISIT (OUTPATIENT)
Dept: FAMILY MEDICINE | Facility: CLINIC | Age: 49
End: 2019-09-13
Payer: COMMERCIAL

## 2019-09-13 ENCOUNTER — ANCILLARY PROCEDURE (OUTPATIENT)
Dept: ULTRASOUND IMAGING | Facility: CLINIC | Age: 49
End: 2019-09-13
Attending: PHYSICIAN ASSISTANT
Payer: COMMERCIAL

## 2019-09-13 ENCOUNTER — TELEPHONE (OUTPATIENT)
Dept: FAMILY MEDICINE | Facility: CLINIC | Age: 49
End: 2019-09-13

## 2019-09-13 VITALS
RESPIRATION RATE: 16 BRPM | OXYGEN SATURATION: 97 % | DIASTOLIC BLOOD PRESSURE: 88 MMHG | TEMPERATURE: 98.5 F | SYSTOLIC BLOOD PRESSURE: 126 MMHG | BODY MASS INDEX: 27.34 KG/M2 | WEIGHT: 213 LBS | HEART RATE: 71 BPM | HEIGHT: 74 IN

## 2019-09-13 DIAGNOSIS — R22.1 MASS OF NECK: Primary | ICD-10-CM

## 2019-09-13 DIAGNOSIS — R22.1 MASS OF NECK: ICD-10-CM

## 2019-09-13 DIAGNOSIS — E04.1 THYROID NODULE: ICD-10-CM

## 2019-09-13 LAB
BASOPHILS # BLD AUTO: 0 10E9/L (ref 0–0.2)
BASOPHILS NFR BLD AUTO: 0.6 %
DIFFERENTIAL METHOD BLD: NORMAL
EOSINOPHIL # BLD AUTO: 0.1 10E9/L (ref 0–0.7)
EOSINOPHIL NFR BLD AUTO: 1.9 %
ERYTHROCYTE [DISTWIDTH] IN BLOOD BY AUTOMATED COUNT: 12.5 % (ref 10–15)
ERYTHROCYTE [SEDIMENTATION RATE] IN BLOOD BY WESTERGREN METHOD: 9 MM/H (ref 0–15)
HCT VFR BLD AUTO: 42.9 % (ref 40–53)
HGB BLD-MCNC: 14.9 G/DL (ref 13.3–17.7)
LYMPHOCYTES # BLD AUTO: 1.5 10E9/L (ref 0.8–5.3)
LYMPHOCYTES NFR BLD AUTO: 29 %
MCH RBC QN AUTO: 29.9 PG (ref 26.5–33)
MCHC RBC AUTO-ENTMCNC: 34.7 G/DL (ref 31.5–36.5)
MCV RBC AUTO: 86 FL (ref 78–100)
MONOCYTES # BLD AUTO: 0.5 10E9/L (ref 0–1.3)
MONOCYTES NFR BLD AUTO: 9.2 %
NEUTROPHILS # BLD AUTO: 3 10E9/L (ref 1.6–8.3)
NEUTROPHILS NFR BLD AUTO: 59.3 %
PLATELET # BLD AUTO: 195 10E9/L (ref 150–450)
RBC # BLD AUTO: 4.99 10E12/L (ref 4.4–5.9)
TSH SERPL DL<=0.005 MIU/L-ACNC: 2.39 MU/L (ref 0.4–4)
WBC # BLD AUTO: 5.1 10E9/L (ref 4–11)

## 2019-09-13 PROCEDURE — 00000102 ZZHCL STATISTIC CYTO WRIGHT STAIN TC: Performed by: RADIOLOGY

## 2019-09-13 PROCEDURE — 85025 COMPLETE CBC W/AUTO DIFF WBC: CPT | Performed by: PHYSICIAN ASSISTANT

## 2019-09-13 PROCEDURE — 88173 CYTOPATH EVAL FNA REPORT: CPT | Performed by: RADIOLOGY

## 2019-09-13 PROCEDURE — 36415 COLL VENOUS BLD VENIPUNCTURE: CPT | Performed by: PHYSICIAN ASSISTANT

## 2019-09-13 PROCEDURE — 99214 OFFICE O/P EST MOD 30 MIN: CPT | Performed by: PHYSICIAN ASSISTANT

## 2019-09-13 PROCEDURE — 76536 US EXAM OF HEAD AND NECK: CPT | Performed by: RADIOLOGY

## 2019-09-13 PROCEDURE — 84443 ASSAY THYROID STIM HORMONE: CPT | Performed by: PHYSICIAN ASSISTANT

## 2019-09-13 PROCEDURE — 10005 FNA BX W/US GDN 1ST LES: CPT | Performed by: RADIOLOGY

## 2019-09-13 PROCEDURE — 85652 RBC SED RATE AUTOMATED: CPT | Performed by: PHYSICIAN ASSISTANT

## 2019-09-13 ASSESSMENT — MIFFLIN-ST. JEOR: SCORE: 1896.94

## 2019-09-13 ASSESSMENT — PAIN SCALES - GENERAL: PAINLEVEL: NO PAIN (0)

## 2019-09-13 NOTE — TELEPHONE ENCOUNTER
Pt returned call    Best number to reach caller: Home number on file [phone] (home)    Is it ok to leave a detailed message: YES     Patient states he just received phone call(no documentation) about his ultrasound results(done today at [facility]) He was also told on message he can have a biopsy today @ 2pm

## 2019-09-13 NOTE — RESULT ENCOUNTER NOTE
Narda Portillo  Here are your ultrasound results as we discussed.   I will notify you of your pathology results as soon as they are available.   I recommend scheduling a follow up with Dr. Hopper in approximately one week- we should have the biopsy results by then.     FMG: [facility] [phone]  Dr. Hopper   [email or web address]    Please call or MyChart my office with any questions or concerns.    Elsie Hsu, PAC

## 2019-09-13 NOTE — PROGRESS NOTES
Subjective     Enrike Ricci is a 49 year old male who presents to clinic today for the following health issues:    HPI   Concern - Swollen lymph node   Onset: 1 week    Description:   Swollen lymph node left side of neck    Intensity: 0/10    Progression of Symptoms:  improving    Accompanying Signs & Symptoms:    Sore    Previous history of similar problem:   no    Precipitating factors:   Worsened by: was in [place] and [place]    Alleviating factors:  Improved by: n/a    Therapies Tried and outcome: none    Works as  for [employer]  No pain now.  At first there was.  Throat scratchy for couple days and ears itched.    Not swollen in past  Has Lost 10 pounds intentional- after physical   Cut out carbs   Dieting since   No family history of thyroid disease  No constipation or dry skin   Was in [place] to visit family and [place] for work   Leaving for [employer] for work in 2 days  Has googled and very concerned about possible diagnoses         Patient Active Problem List   Diagnosis     CARDIOVASCULAR SCREENING; LDL GOAL LESS THAN 160     Dysthymia     GERD (gastroesophageal reflux disease)     Vitamin D deficiency disease     Family history of brain aneurysm     Hyperlipidemia LDL goal <130     Past Surgical History:   Procedure Laterality Date     ARTHROSCOPY KNEE       EYE SURGERY Left as a child       Social History     Tobacco Use     Smoking status: Never Smoker     Smokeless tobacco: Never Used   Substance Use Topics     Alcohol use: No     Family History   Problem Relation Age of Onset     Aneurysm Mother 49     Esophageal Cancer Father 64     Cancer Maternal Grandmother 48     Heart Failure Maternal Grandfather          age mid 90s      Coronary Artery Disease No family hx of      Colon Cancer No family hx of          Current Outpatient Medications   Medication Sig Dispense Refill     aspirin 81 MG chewable tablet Take 81 mg by mouth daily       atorvastatin (LIPITOR) 10 MG  "tablet Take 1 tablet (10 mg) by mouth daily 90 tablet 3     buPROPion (WELLBUTRIN XL) 150 MG 24 hr tablet Take 1 tablet (150 mg) by mouth every morning +++ appointment needed for additional refills +++ 30 tablet 0     cholecalciferol (VITAMIN D) 1000 UNIT tablet Take 2 tablets (2,000 Units) by mouth daily 100 tablet 3     co-enzyme Q-10 50 MG CAPS  90 capsule      niacin 500 MG tablet Take by mouth daily (with breakfast) 180 tablet      omega 3 1000 MG CAPS Take 1 g by mouth daily 90 capsule      ranitidine (ZANTAC) 150 MG tablet TAKE 1 TABLET BY MOUTH ONCE OR TWICE DAILY AS NEEDED 180 tablet 1     BP Readings from Last 3 Encounters:   09/13/19 126/88   06/05/19 125/81   02/07/19 108/80    Wt Readings from Last 3 Encounters:   09/13/19 96.6 kg (213 lb)   06/05/19 99.6 kg (219 lb 9.6 oz)   02/07/19 98 kg (216 lb)                      Reviewed and updated as needed this visit by Provider  Tobacco  Allergies  Meds  Problems  Med Hx  Surg Hx  Fam Hx  Soc Hx          Review of Systems   ROS COMP: Constitutional, HEENT, cardiovascular, pulmonary, gi and gu systems are negative, except as otherwise noted.      Objective    /88 (BP Location: Right arm, Patient Position: Chair, Cuff Size: Adult Large)   Pulse 71   Temp 98.5  F (36.9  C) (Oral)   Resp 16   Ht 1.873 m (6' 1.75\")   Wt 96.6 kg (213 lb)   SpO2 97%   BMI 27.53 kg/m    Body mass index is 27.53 kg/m .  Physical Exam   GENERAL: healthy, alert and no distress  EYES: Eyes grossly normal to inspection, PERRL and conjunctivae and sclerae normal  HENT: ear canals and TM's normal, nose and mouth without ulcers or lesions  NECK: no adenopathy, no asymmetry, masses, or scars and thyroid nodule left thyroid approximately 3 centimeter diameter nontender, no erythema   RESP: lungs clear to auscultation - no rales, rhonchi or wheezes  CV: regular rate and rhythm, normal S1 S2, no S3 or S4, no murmur, click or rub, no peripheral edema and peripheral pulses " "strong  ABDOMEN: soft, nontender, no hepatosplenomegaly, no masses and bowel sounds normal  MS: no gross musculoskeletal defects noted, no edema  PSYCH: mentation appears normal, affect normal/bright    Diagnostic Test Results:  Results for orders placed or performed in visit on 09/13/19   CBC with platelets differential   Result Value Ref Range    WBC 5.1 4.0 - 11.0 10e9/L    RBC Count 4.99 4.4 - 5.9 10e12/L    Hemoglobin 14.9 13.3 - 17.7 g/dL    Hematocrit 42.9 40.0 - 53.0 %    MCV 86 78 - 100 fl    MCH 29.9 26.5 - 33.0 pg    MCHC 34.7 31.5 - 36.5 g/dL    RDW 12.5 10.0 - 15.0 %    Platelet Count 195 150 - 450 10e9/L    % Neutrophils 59.3 %    % Lymphocytes 29.0 %    % Monocytes 9.2 %    % Eosinophils 1.9 %    % Basophils 0.6 %    Absolute Neutrophil 3.0 1.6 - 8.3 10e9/L    Absolute Lymphocytes 1.5 0.8 - 5.3 10e9/L    Absolute Monocytes 0.5 0.0 - 1.3 10e9/L    Absolute Eosinophils 0.1 0.0 - 0.7 10e9/L    Absolute Basophils 0.0 0.0 - 0.2 10e9/L    Diff Method Automated Method    ESR: Erythrocyte sedimentation rate   Result Value Ref Range    Sed Rate 9 0 - 15 mm/h           Assessment & Plan     1. Mass of neck  Large thyroid- normal blood counts and sed rate. Thyroid function pending   - CBC with platelets differential  - TSH with free T4 reflex  - ESR: Erythrocyte sedimentation rate  - JUST IN CASE  - GENERAL SURG ADULT REFERRAL    2. Thyroid nodule  Will have ultrasound later today and will plan on having him follow up with Dr. Hopper   - GENERAL SURG ADULT REFERRAL     BMI:   Estimated body mass index is 27.53 kg/m  as calculated from the following:    Height as of this encounter: 1.873 m (6' 1.75\").    Weight as of this encounter: 96.6 kg (213 lb).   Weight management plan: Discussed healthy diet and exercise guidelines        Patient Instructions   Go to [facility] ([phone]) for ultrasound today.  I will call with results.   I will notify you of lab results.   Return " urgently if any change in symptoms like increasing size fever, sore throat, cough , chest pain , shortness of breath or other change in symptoms         Return in about 2 weeks (around 9/27/2019), or if symptoms worsen or fail to improve.    Elsie Hsu PA-C  [facility]

## 2019-09-13 NOTE — PATIENT INSTRUCTIONS
Go to [facility] ([phone]) for ultrasound today.  I will call with results.   I will notify you of lab results.   Return urgently if any change in symptoms like increasing size fever, sore throat, cough , chest pain , shortness of breath or other change in symptoms

## 2019-09-14 NOTE — RESULT ENCOUNTER NOTE
Narda Portillo  Your thyroid function was normal.   Your inflammatory markers were normal.   Your blood counts were normal.   Please call or MyChart my office with any questions or concerns.   Elsie Hsu, PAC

## 2019-09-14 NOTE — RESULT ENCOUNTER NOTE
Narda Portillo  It will take some time to get your biopsy results.  We will notify you as soon as we do.   Please call or MyChart my office with any questions or concerns.    Elsie Hsu, PAC

## 2019-09-16 LAB — COPATH REPORT: NORMAL

## 2019-09-17 DIAGNOSIS — E04.1 THYROID NODULE: Primary | ICD-10-CM

## 2019-09-17 NOTE — RESULT ENCOUNTER NOTE
Narda Portillo  Here are your pathology results.   It is a thyroid cancer as we discussed.   Follow up with Dr. Hopper (surgeon) and Dr. Dacosta (endocrinology) at the [facility] as soon as possible.   Please call or MyChart my office with any questions or concerns.    Elsie Hsu, PAC

## 2019-09-24 ENCOUNTER — OFFICE VISIT (OUTPATIENT)
Dept: SURGERY | Facility: CLINIC | Age: 49
End: 2019-09-24
Payer: COMMERCIAL

## 2019-09-24 VITALS
SYSTOLIC BLOOD PRESSURE: 130 MMHG | DIASTOLIC BLOOD PRESSURE: 88 MMHG | TEMPERATURE: 98.3 F | WEIGHT: 211 LBS | HEART RATE: 81 BPM | BODY MASS INDEX: 27.27 KG/M2

## 2019-09-24 DIAGNOSIS — C73 MALIGNANT NEOPLASM OF THYROID GLAND (H): Primary | ICD-10-CM

## 2019-09-24 PROCEDURE — 99245 OFF/OP CONSLTJ NEW/EST HI 55: CPT | Performed by: SURGERY

## 2019-09-24 ASSESSMENT — PAIN SCALES - GENERAL: PAINLEVEL: NO PAIN (0)

## 2019-09-24 NOTE — PROGRESS NOTES
I was asked to see Enrike Ricci regarding thyroid cancer by Elsie Hsu.      Enrike Ricci is a 49 year old male with thyroid cnacer. This was found after he noticed a lump on his neck. He denies any pain. The patient reports that there has been growth recently as he just noticed it .less than a few weeks ago.  There are no associated symptoms of dysphagia, dysphonia, or dyspnea. The patient denies a family history of thyroid cancer or a history of exposure to ionizing radiation. The TSH was 2.39 on 9/13/19.  An ultrasound on 9/13/19 showed a suspicious.  An FNA on 9/13/19 revealed papillary thyroid carcinoma.    Past Medical History:   has a past medical history of H/O dysthymia.    Past Surgical History:  Past Surgical History:  No date: ARTHROSCOPY KNEE  as a child: EYE SURGERY; Left     Social History:  Social History    Socioeconomic History      Marital status:       Spouse name: Not on file      Number of children: Not on file      Years of education: Not on file      Highest education level: Not on file    Occupational History      Not on file    Social Needs      Financial resource strain: Not on file      Food insecurity:        Worry: Not on file        Inability: Not on file      Transportation needs:        Medical: Not on file        Non-medical: Not on file    Tobacco Use      Smoking status: Never Smoker      Smokeless tobacco: Never Used    Substance and Sexual Activity      Alcohol use: No      Drug use: No      Sexual activity: Yes        Partners: Female    Lifestyle      Physical activity:        Days per week: Not on file        Minutes per session: Not on file      Stress: Not on file    Relationships      Social connections:        Talks on phone: Not on file        Gets together: Not on file        Attends Baptism service: Not on file        Active member of club or organization: Not on file        Attends meetings of clubs or organizations: Not on file         Relationship status: Not on file      Intimate partner violence:        Fear of current or ex partner: Not on file        Emotionally abused: Not on file        Physically abused: Not on file        Forced sexual activity: Not on file    Other Topics      Concerns:        Parent/sibling w/ CABG, MI or angioplasty before 65F 55M?: Not Asked    Social History Narrative      Not on file       Family History:  Review of patient's family history indicates:  Problem: Aneurysm      Relation: Mother          Age of Onset: 49  Problem: Esophageal Cancer      Relation: Father          Age of Onset: 64  Problem: Cancer      Relation: Maternal Grandmother          Age of Onset: 48  Problem: Heart Failure      Relation: Maternal Grandfather          Age of Onset: (Not Specified)          Comment:  age mid 90s   Problem: Coronary Artery Disease      Relation: No family hx of          Age of Onset: (Not Specified)  Problem: Colon Cancer      Relation: No family hx of          Age of Onset: (Not Specified)      ROS:  General: negative for fever or chills  Skin: negative except mass noted above  Ears/Nose/Throat: negative for, epistaxis, bleeding gums  Respiratory: No shortness of breath, dyspnea on exertion, cough, or hemoptysis  Cardiovascular: negative for and chest pain  Gastrointestinal: negative for, nausea, vomiting and abdominal pain  Genitourinary: negative for and frequency  Neurologic: negative for and local weakness  Hematologic/Lymphatic/Immunologic: negative for, bleeding disorder and frequent infections  Endocrine: negative for, diabetes, polydipsia and polyuria    PHYSICAL EXAMINATION: A comprehensive head and neck examination was performed. Of note, there is visible enlargement of left lobe of the thyroid gland.     Vitals: /88   Pulse 81   Temp 98.3  F (36.8  C) (Oral)   Wt 95.7 kg (211 lb)   BMI 27.27 kg/m    BMI= Body mass index is 27.27 kg/m .  Constitutional: healthy, alert and no distress  Head:  Normocephalic. No masses, lesions, tenderness or abnormalities  Neck: Neck supple. No adenopathy.   ENT: Mucous membranes moist, no cervical lymphadenopathy noted.  Respiratory:  Non-labored respiration  Musculoskeletal: No gross deformity  Neurologic: No focal deficits  Psychiatric: mentation appears normal and affect normal/bright  Hematologic/Lymphatic/Immunologic: No bruising noted    US thyroid    Left Lobe:   Nodule 1:  Nodule measurement: 3.1 x 3.4 x 3 cm ,  Echogenicity: Hypoechoic  Consistency: solid  Calcifications: yes  Hypervascular: yes  Interval growth (>20%):    IMPRESSION: Thyroid cancer    RECOMMENDATION:  Given the FNA results thyroidectomy is reasonable. Fortunately, he is an excellent candidate for a total thyroidectomy with laryngeal nerve monitoring on an outpatient basis. The risks, benefits and alternatives of the surgery were discussed, including (but not limited to) nerve injury (temporary or permanent), voice changes or breathing difficulty, hypoparathyroidism (temporary or permanent), pain, bleeding, infection, scarring, persistent disease and the potential need for thyroid supplementation or additional surgeries. The patient is eager to proceed with surgery, and we have taken the liberty of arranging this.     Prior to surgery he would need and US or CT scan to look for any adenopathy. He would also require a laryngoscopy. Since he has an appointment at [facility] I will wait till after they had ran any additional testing and if they do not do the US or CT and laryngoscopy prior to surgery I will arrange it here. Otherwise we will get the [facility] records if he chooses to have it here.     Thank you for entrusting me with the care of your patient. If you have any questions or concerns I hope you will contact me.    Time spent with the patient with greater that 50% of the time in discussion was 80 minutes.

## 2019-09-24 NOTE — LETTER
9/24/2019         RE: Enrike Ricci  [address]  [place] [address]        Dear Colleague,    Thank you for referring your patient, Enrike Ricci, to the [facility]. Please see a copy of my visit note below.    I was asked to see Enrike Ricci regarding thyroid cancer by Elsie Hsu.      Enrike Ricci is a 49 year old male with thyroid cnacer. This was found after he noticed a lump on his neck. He denies any pain. The patient reports that there has been growth recently as he just noticed it .less than a few weeks ago.  There are no associated symptoms of dysphagia, dysphonia, or dyspnea. The patient denies a family history of thyroid cancer or a history of exposure to ionizing radiation. The TSH was 2.39 on 9/13/19.  An ultrasound on 9/13/19 showed a suspicious.  An FNA on 9/13/19 revealed papillary thyroid carcinoma.    Past Medical History:   has a past medical history of H/O dysthymia.    Past Surgical History:  Past Surgical History:  No date: ARTHROSCOPY KNEE  as a child: EYE SURGERY; Left     Social History:  Social History    Socioeconomic History      Marital status:       Spouse name: Not on file      Number of children: Not on file      Years of education: Not on file      Highest education level: Not on file    Occupational History      Not on file    Social Needs      Financial resource strain: Not on file      Food insecurity:        Worry: Not on file        Inability: Not on file      Transportation needs:        Medical: Not on file        Non-medical: Not on file    Tobacco Use      Smoking status: Never Smoker      Smokeless tobacco: Never Used    Substance and Sexual Activity      Alcohol use: No      Drug use: No      Sexual activity: Yes        Partners: Female    Lifestyle      Physical activity:        Days per week: Not on file        Minutes per session: Not on file      Stress: Not on file    Relationships      Social connections:         Talks on phone: Not on file        Gets together: Not on file        Attends Orthodox service: Not on file        Active member of club or organization: Not on file        Attends meetings of clubs or organizations: Not on file        Relationship status: Not on file      Intimate partner violence:        Fear of current or ex partner: Not on file        Emotionally abused: Not on file        Physically abused: Not on file        Forced sexual activity: Not on file    Other Topics      Concerns:        Parent/sibling w/ CABG, MI or angioplasty before 65F 55M?: Not Asked    Social History Narrative      Not on file       Family History:  Review of patient's family history indicates:  Problem: Aneurysm      Relation: Mother          Age of Onset: 49  Problem: Esophageal Cancer      Relation: Father          Age of Onset: 64  Problem: Cancer      Relation: Maternal Grandmother          Age of Onset: 48  Problem: Heart Failure      Relation: Maternal Grandfather          Age of Onset: (Not Specified)          Comment:  age mid 90s   Problem: Coronary Artery Disease      Relation: No family hx of          Age of Onset: (Not Specified)  Problem: Colon Cancer      Relation: No family hx of          Age of Onset: (Not Specified)      ROS:  General: negative for fever or chills  Skin: negative except mass noted above  Ears/Nose/Throat: negative for, epistaxis, bleeding gums  Respiratory: No shortness of breath, dyspnea on exertion, cough, or hemoptysis  Cardiovascular: negative for and chest pain  Gastrointestinal: negative for, nausea, vomiting and abdominal pain  Genitourinary: negative for and frequency  Neurologic: negative for and local weakness  Hematologic/Lymphatic/Immunologic: negative for, bleeding disorder and frequent infections  Endocrine: negative for, diabetes, polydipsia and polyuria    PHYSICAL EXAMINATION: A comprehensive head and neck examination was performed. Of note, there is visible enlargement of  left lobe of the thyroid gland.     Vitals: /88   Pulse 81   Temp 98.3  F (36.8  C) (Oral)   Wt 95.7 kg (211 lb)   BMI 27.27 kg/m    BMI= Body mass index is 27.27 kg/m .  Constitutional: healthy, alert and no distress  Head: Normocephalic. No masses, lesions, tenderness or abnormalities  Neck: Neck supple. No adenopathy.   ENT: Mucous membranes moist, no cervical lymphadenopathy noted.  Respiratory:  Non-labored respiration  Musculoskeletal: No gross deformity  Neurologic: No focal deficits  Psychiatric: mentation appears normal and affect normal/bright  Hematologic/Lymphatic/Immunologic: No bruising noted    US thyroid    Left Lobe:   Nodule 1:  Nodule measurement: 3.1 x 3.4 x 3 cm ,  Echogenicity: Hypoechoic  Consistency: solid  Calcifications: yes  Hypervascular: yes  Interval growth (>20%):    IMPRESSION: Thyroid cancer    RECOMMENDATION:  Given the FNA results thyroidectomy is reasonable. Fortunately, he is an excellent candidate for a total thyroidectomy with laryngeal nerve monitoring on an outpatient basis. The risks, benefits and alternatives of the surgery were discussed, including (but not limited to) nerve injury (temporary or permanent), voice changes or breathing difficulty, hypoparathyroidism (temporary or permanent), pain, bleeding, infection, scarring, persistent disease and the potential need for thyroid supplementation or additional surgeries. The patient is eager to proceed with surgery, and we have taken the liberty of arranging this.     Prior to surgery he would need and US or CT scan to look for any adenopathy. He would also require a laryngoscopy. Since he has an appointment at [facility] I will wait till after they had ran any additional testing and if they do not do the US or CT and laryngoscopy prior to surgery I will arrange it here. Otherwise we will get the [facility] records if he chooses to have it here.     Thank you for entrusting me with the care of your patient. If you have any  questions or concerns I hope you will contact me.      Again, thank you for allowing me to participate in the care of your patient.        Sincerely,        Jaime Hopper, DO

## 2019-09-25 ENCOUNTER — OFFICE VISIT (OUTPATIENT)
Dept: ENDOCRINOLOGY | Facility: CLINIC | Age: 49
End: 2019-09-25
Payer: COMMERCIAL

## 2019-09-25 VITALS
HEART RATE: 83 BPM | SYSTOLIC BLOOD PRESSURE: 135 MMHG | WEIGHT: 211.4 LBS | BODY MASS INDEX: 27.33 KG/M2 | OXYGEN SATURATION: 97 % | DIASTOLIC BLOOD PRESSURE: 87 MMHG

## 2019-09-25 DIAGNOSIS — C73 PAPILLARY THYROID CARCINOMA (H): Primary | ICD-10-CM

## 2019-09-25 PROCEDURE — 99204 OFFICE O/P NEW MOD 45 MIN: CPT | Performed by: INTERNAL MEDICINE

## 2019-09-25 NOTE — PATIENT INSTRUCTIONS
-Make sure ultrasound at [facility] examines cervical lymph nodes chains 2-7.   -Let me know when you go for surgery. We will schedule a follow up after to review pathology and treatment options.

## 2019-09-25 NOTE — PROGRESS NOTES
CC: Thyroid cancer.     HPI: Patient is here for management of thyroid cancer.   Noticed a lump in his neck and went for evaluation.     US 9/13/19:  Findings:    Thyroid parenchyma: Homogeneous  The right lobe of the thyroid measures: 5.5 x 1.3 x 1.7 cm   The thyroid isthmus measures: 0.4 cm   The left lobe of the thyroid measures: 4.6 x 3.4 x 3.8 cm      Left Lobe:   Nodule 1:  Nodule measurement: 3.1 x 3.4 x 3 cm ,  Echogenicity: Hypoechoic  Consistency: solid  Calcifications: yes  Hypervascular: yes  Interval growth (>20%):                                                              Impression:  Suspicious left thyroid nodule. Recommend fine-needle aspiration.     A FNA on 9/13/19 revealed papillary thyroid cancer.    No prior work with radiation.     He met with Dr Hoppre, Surgery, yesterday.   He has not set up a date for surgery yet.   Notes he has additional workup pending at the [facility].     Occasional dysphagia. Notes hoarseness at the end of the day which is a new issue.     ROS: 10 point ROS neg other than the symptoms noted above in the HPI.    PMH:   Patient Active Problem List   Diagnosis     CARDIOVASCULAR SCREENING; LDL GOAL LESS THAN 160     Dysthymia     GERD (gastroesophageal reflux disease)     Vitamin D deficiency disease     Family history of brain aneurysm     Hyperlipidemia LDL goal <130     Meds:  Current Outpatient Medications   Medication     aspirin 81 MG chewable tablet     atorvastatin (LIPITOR) 10 MG tablet     buPROPion (WELLBUTRIN XL) 150 MG 24 hr tablet     cholecalciferol (VITAMIN D) 1000 UNIT tablet     co-enzyme Q-10 50 MG CAPS     niacin 500 MG tablet     omega 3 1000 MG CAPS     ranitidine (ZANTAC) 150 MG tablet     No current facility-administered medications for this visit.      FHX:   No thyroid disease.     SHX:  Non-smoker.     Exam:   Vital signs:      BP: 135/87 Pulse: 83     SpO2: 97 %       Weight: 95.9 kg (211 lb 6.4 oz)  Estimated body mass index is 27.33 kg/m   "as calculated from the following:    Height as of 9/13/19: 1.873 m (6' 1.75\").    Weight as of this encounter: 95.9 kg (211 lb 6.4 oz).  Gen: In NAD.   HEENT: no proptosis or lid lag, EOMI, ~3 cm nodule in left thyroid.   Card: S1 S2 RRR no m/r/g. no LE edema.   Pulm: CTA b/l.   GI: NT ND +BS.   MSK: no gross deformities.   Derm: no rashes or lesions.   Neuro: no tremor, +2 DTR's.     A/P:   Thyroid cancer - Natural history and management of thyroid cancer reviewed. Discussed need for pre-op US to evaluate for adenopathy. Discussed lobectomy versus total thyroidectomy. Discussed possible need for I 131 post-op. Patient has second opinion coming up at [place].   -Make sure ultrasound at [place] examines cervical lymph nodes chains 2-7.   -Let me know when you go for surgery. We will schedule a follow up after to review pathology and treatment options.       Enrike Dacosta MD on 9/25/2019 at 1:38 PM    "

## 2019-09-25 NOTE — LETTER
9/25/2019         RE: Enrike Ricci  [address]  [facility] [address]        Dear Colleague,    Thank you for referring your patient, Enrike Ricci, to the [facility]. Please see a copy of my visit note below.    CC: Thyroid cancer.     HPI: Patient is here for management of thyroid cancer.   Noticed a lump in his neck and went for evaluation.     US 9/13/19:  Findings:    Thyroid parenchyma: Homogeneous  The right lobe of the thyroid measures: 5.5 x 1.3 x 1.7 cm   The thyroid isthmus measures: 0.4 cm   The left lobe of the thyroid measures: 4.6 x 3.4 x 3.8 cm      Left Lobe:   Nodule 1:  Nodule measurement: 3.1 x 3.4 x 3 cm ,  Echogenicity: Hypoechoic  Consistency: solid  Calcifications: yes  Hypervascular: yes  Interval growth (>20%):                                                              Impression:  Suspicious left thyroid nodule. Recommend fine-needle aspiration.     A FNA on 9/13/19 revealed papillary thyroid cancer.    No prior work with radiation.     He met with Dr Hopper, Surgery, yesterday.   He has not set up a date for surgery yet.   Notes he has additional workup pending at the [facility].     Occasional dysphagia. Notes hoarseness at the end of the day which is a new issue.     ROS: 10 point ROS neg other than the symptoms noted above in the HPI.    PMH:   Patient Active Problem List   Diagnosis     CARDIOVASCULAR SCREENING; LDL GOAL LESS THAN 160     Dysthymia     GERD (gastroesophageal reflux disease)     Vitamin D deficiency disease     Family history of brain aneurysm     Hyperlipidemia LDL goal <130     Meds:  Current Outpatient Medications   Medication     aspirin 81 MG chewable tablet     atorvastatin (LIPITOR) 10 MG tablet     buPROPion (WELLBUTRIN XL) 150 MG 24 hr tablet     cholecalciferol (VITAMIN D) 1000 UNIT tablet     co-enzyme Q-10 50 MG CAPS     niacin 500 MG tablet     omega 3 1000 MG CAPS     ranitidine (ZANTAC) 150 MG tablet     No current  "facility-administered medications for this visit.      FHX:   No thyroid disease.     SHX:  Non-smoker.     Exam:   Vital signs:      BP: 135/87 Pulse: 83     SpO2: 97 %       Weight: 95.9 kg (211 lb 6.4 oz)  Estimated body mass index is 27.33 kg/m  as calculated from the following:    Height as of 9/13/19: 1.873 m (6' 1.75\").    Weight as of this encounter: 95.9 kg (211 lb 6.4 oz).  Gen: In NAD.   HEENT: no proptosis or lid lag, EOMI, ~3 cm nodule in left thyroid.   Card: S1 S2 RRR no m/r/g. no LE edema.   Pulm: CTA b/l.   GI: NT ND +BS.   MSK: no gross deformities.   Derm: no rashes or lesions.   Neuro: no tremor, +2 DTR's.     A/P:   Thyroid cancer - Natural history and management of thyroid cancer reviewed. Discussed need for pre-op US to evaluate for adenopathy. Discussed lobectomy versus total thyroidectomy. Discussed possible need for I 131 post-op. Patient has second opinion coming up at [place].   -Make sure ultrasound at [place] examines cervical lymph nodes chains 2-7.   -Let me know when you go for surgery. We will schedule a follow up after to review pathology and treatment options.       Enrike Daocsta MD on 9/25/2019 at 1:38 PM      Again, thank you for allowing me to participate in the care of your patient.        Sincerely,        Enrike Dacosta MD    "

## 2019-10-01 ENCOUNTER — MYC MEDICAL ADVICE (OUTPATIENT)
Dept: ENDOCRINOLOGY | Facility: CLINIC | Age: 49
End: 2019-10-01

## 2019-10-08 ENCOUNTER — TELEPHONE (OUTPATIENT)
Dept: ENDOCRINOLOGY | Facility: CLINIC | Age: 49
End: 2019-10-08

## 2019-10-08 NOTE — TELEPHONE ENCOUNTER
Left message for patient to let him know the HANNAH is filled out and signed. Would like to know if he is picking it up in [place] or if he would like it faxed somewhere.    Alissa Harrison CMA, POONAM

## 2019-10-08 NOTE — TELEPHONE ENCOUNTER
Reason for Call:  Form, our goal is to have forms completed with 72 hours, however, some forms may require a visit or additional information.    Type of letter, form or note:  HANNAH    Where did the form come from: Patient or family brought in       What clinic location was the form placed at?: [facility]    Where the form was placed: Given to MA/RN    What number is listed as a contact on the form?: n/a       Additional comments: please fax form to [phone]     Support [id number]    Thanks.    Call taken on 10/8/2019 at 10:38 AM by Maritza Mackenzie

## 2019-11-08 ENCOUNTER — HEALTH MAINTENANCE LETTER (OUTPATIENT)
Age: 49
End: 2019-11-08

## 2019-11-18 ENCOUNTER — OFFICE VISIT (OUTPATIENT)
Dept: FAMILY MEDICINE | Facility: CLINIC | Age: 49
End: 2019-11-18
Payer: COMMERCIAL

## 2019-11-18 VITALS
BODY MASS INDEX: 26.56 KG/M2 | WEIGHT: 207 LBS | HEART RATE: 68 BPM | DIASTOLIC BLOOD PRESSURE: 86 MMHG | OXYGEN SATURATION: 97 % | SYSTOLIC BLOOD PRESSURE: 135 MMHG | HEIGHT: 74 IN | RESPIRATION RATE: 18 BRPM | TEMPERATURE: 98.1 F

## 2019-11-18 DIAGNOSIS — M62.830 BACK MUSCLE SPASM: Primary | ICD-10-CM

## 2019-11-18 DIAGNOSIS — C73 MALIGNANT NEOPLASM OF THYROID GLAND (H): ICD-10-CM

## 2019-11-18 PROCEDURE — 99213 OFFICE O/P EST LOW 20 MIN: CPT | Performed by: FAMILY MEDICINE

## 2019-11-18 RX ORDER — CYCLOBENZAPRINE HCL 10 MG
10 TABLET ORAL
Qty: 15 TABLET | Refills: 0 | Status: SHIPPED | OUTPATIENT
Start: 2019-11-18 | End: 2021-07-26

## 2019-11-18 RX ORDER — LEVOTHYROXINE SODIUM 125 UG/1
125 TABLET ORAL DAILY
COMMUNITY
Start: 2019-11-18 | End: 2021-07-26 | Stop reason: DRUGHIGH

## 2019-11-18 ASSESSMENT — ANXIETY QUESTIONNAIRES
1. FEELING NERVOUS, ANXIOUS, OR ON EDGE: SEVERAL DAYS
2. NOT BEING ABLE TO STOP OR CONTROL WORRYING: SEVERAL DAYS
6. BECOMING EASILY ANNOYED OR IRRITABLE: MORE THAN HALF THE DAYS
3. WORRYING TOO MUCH ABOUT DIFFERENT THINGS: SEVERAL DAYS
5. BEING SO RESTLESS THAT IT IS HARD TO SIT STILL: NOT AT ALL
GAD7 TOTAL SCORE: 7
7. FEELING AFRAID AS IF SOMETHING AWFUL MIGHT HAPPEN: SEVERAL DAYS

## 2019-11-18 ASSESSMENT — PATIENT HEALTH QUESTIONNAIRE - PHQ9
SUM OF ALL RESPONSES TO PHQ QUESTIONS 1-9: 8
5. POOR APPETITE OR OVEREATING: SEVERAL DAYS

## 2019-11-18 ASSESSMENT — MIFFLIN-ST. JEOR: SCORE: 1869.73

## 2019-11-18 NOTE — PATIENT INSTRUCTIONS
Continue the Ibuprofen 600 mg three times a day for 2-4 days then as needed up to three times a day.    Flexeril 10 mg at night for muscle spasm - use for the next 3-5 days and then as needed.        At [facility], we strive to deliver an exceptional experience to you, every time we see you. If you receive a survey, please complete it as we do value your feedback.  If you have MyChart, you can expect to receive results automatically within 24 hours of their completion.  Your provider will send a note interpreting your results as well.   If you do not have MyChart, you should receive your results in about a week by mail.    Your care team:     Family Medicine   ASYA Rainey MD Emily Bunt, APRN CNP S. Matthew Hockett, MD Pamela Kolacz, MD Angela Wermerskirchen, MD    Internal Medicine  Nigel Jacobs MD coming 2020     Clinic hours: Monday - Wednesday 7 am-7 pm   Thursdays and Fridays 7 am-5 pm.     [facility]: Monday - Friday 11 am-9 pm,   Saturday and Sunday 9 am-5 pm.    [facility]: Monday -Thursday 8 am-8 pm; Friday 8 am-6 pm; Saturday and Sunday 9 am-5 pm.     [facility]: Monday - Thursday 8 am - 7 pm; Friday 8 am - 6 pm    Clinic: [phone]   [facility]: [phone]: [phone]

## 2019-11-18 NOTE — LETTER
My Depression Action Plan  Name: Enrike Ricci   Date of Birth 1970  Date: 11/18/2019    My doctor: Clinic, [facility]   My clinic: [address] [address] [phone]          GREEN    ZONE   Good Control    What it looks like:     Things are going generally well. You have normal up s and down s. You may even feel depressed from time to time, but bad moods usually last less than a day.   What you need to do:  1. Continue to care for yourself (see self care plan)  2. Check your depression survival kit and update it as needed  3. Follow your physician s recommendations including any medication.  4. Do not stop taking medication unless you consult with your physician first.           YELLOW         ZONE Getting Worse    What it looks like:     Depression is starting to interfere with your life.     It may be hard to get out of bed; you may be starting to isolate yourself from others.    Symptoms of depression are starting to last most all day and this has happened for several days.     You may have suicidal thoughts but they are not constant.   What you need to do:     1. Call your care team, your response to treatment will improve if you keep your care team informed of your progress. Yellow periods are signs an adjustment may need to be made.     2. Continue your self-care, even if you have to fake it!    3. Talk to someone in your support network    4. Open up your depression survival kit           RED    ZONE Medical Alert - Get Help    What it looks like:     Depression is seriously interfering with your life.     You may experience these or other symptoms: You can t get out of bed most days, can t work or engage in other necessary activities, you have trouble taking care of basic hygiene, or basic responsibilities, thoughts of suicide or death that will not go away, self-injurious behavior.     What you need to do:  1. Call  your care team and request a same-day appointment. If they are not available (weekends or after hours) call your local crisis line, emergency room or 911.            Depression Self Care Plan / Survival Kit    Self-Care for Depression  Here s the deal. Your body and mind are really not as separate as most people think.  What you do and think affects how you feel and how you feel influences what you do and think. This means if you do things that people who feel good do, it will help you feel better.  Sometimes this is all it takes.  There is also a place for medication and therapy depending on how severe your depression is, so be sure to consult with your medical provider and/ or Behavioral Health Consultant if your symptoms are worsening or not improving.     In order to better manage my stress, I will:    Exercise  Get some form of exercise, every day. This will help reduce pain and release endorphins, the  feel good  chemicals in your brain. This is almost as good as taking antidepressants!  This is not the same as joining a gym and then never going! (they count on that by the way ) It can be as simple as just going for a walk or doing some gardening, anything that will get you moving.      Hygiene   Maintain good hygiene (Get out of bed in the morning, Make your bed, Brush your teeth, Take a shower, and Get dressed like you were going to work, even if you are unemployed).  If your clothes don't fit try to get ones that do.    Diet  I will strive to eat foods that are good for me, drink plenty of water, and avoid excessive sugar, caffeine, alcohol, and other mood-altering substances.  Some foods that are helpful in depression are: complex carbohydrates, B vitamins, flaxseed, fish or fish oil, fresh fruits and vegetables.    Psychotherapy  I agree to participate in Individual Therapy (if recommended).    Medication  If prescribed medications, I agree to take them.  Missing doses can result in serious side effects.   I understand that drinking alcohol, or other illicit drug use, may cause potential side effects.  I will not stop my medication abruptly without first discussing it with my provider.    Staying Connected With Others  I will stay in touch with my friends, family members, and my primary care provider/team.    Use your imagination  Be creative.  We all have a creative side; it doesn t matter if it s oil painting, sand castles, or mud pies! This will also kick up the endorphins.    Witness Beauty  (AKA stop and smell the roses) Take a look outside, even in mid-winter. Notice colors, textures. Watch the squirrels and birds.     Service to others  Be of service to others.  There is always someone else in need.  By helping others we can  get out of ourselves  and remember the really important things.  This also provides opportunities for practicing all the other parts of the program.    Humor  Laugh and be silly!  Adjust your TV habits for less news and crime-drama and more comedy.    Control your stress  Try breathing deep, massage therapy, biofeedback, and meditation. Find time to relax each day.     My support system    Clinic Contact:  Phone number:    Contact 1:  Phone number:    Contact 2:  Phone number:    [facility]/:  Phone number:    Therapist:  Phone number:    [facility] crisis center:    Phone number:    Other community support:  Phone number:

## 2019-11-18 NOTE — PROGRESS NOTES
Subjective     Enrike Ricci is a 49 year old male who presents to clinic today for the following health issues:    HPI   Back Pain       Duration: 11/16/19        Specific cause: lifting/picking up grocery. Had thyroid surgery 5 weeks ago Friday.     Description:   Location of pain: middle of back left  Character of pain: sharp and cramping  Pain radiation:none  New numbness or weakness in legs, not attributed to pain:  no     Intensity: Currently 5-8/10    History:   Pain interferes with job: YES  History of back problems: no prior back problems  Any previous MRI or X-rays: None  Sees a specialist for back pain:  No  Therapies tried without relief: heat     Alleviating factors:   Improved by: Lying flat     Precipitating factors:  Worsened by: Lifting, Bending and Standing    Patient states that he was trying to lift groceries when his back was injured and resulted in pain. incident onset of 2 days and patient explains that he takes ibuprofen 600 mg once a day.  The patient explains that pain occurres mainly when moving around.  Very sore in AM on rising.      Current Outpatient Medications   Medication Sig Dispense Refill     aspirin 81 MG chewable tablet Take 81 mg by mouth daily       atorvastatin (LIPITOR) 10 MG tablet Take 1 tablet (10 mg) by mouth daily 90 tablet 3     buPROPion (WELLBUTRIN XL) 150 MG 24 hr tablet Take 1 tablet (150 mg) by mouth every morning 90 tablet 0     cholecalciferol (VITAMIN D) 1000 UNIT tablet Take 2 tablets (2,000 Units) by mouth daily 100 tablet 3     co-enzyme Q-10 50 MG CAPS  90 capsule      niacin 500 MG tablet Take by mouth daily (with breakfast) 180 tablet      omega 3 1000 MG CAPS Take 1 g by mouth daily 90 capsule      ranitidine (ZANTAC) 150 MG tablet TAKE 1 TABLET BY MOUTH ONCE OR TWICE DAILY AS NEEDED (Patient not taking: Reported on 11/18/2019) 180 tablet 1       Reviewed and updated as needed this visit by Provider  Tobacco  Allergies  Meds  Med Hx  Surg Hx   "Fam Hx  Soc Hx        Review of Systems   POSITIVE for back pain  10 point ROS of systems including Constitutional, Eyes, Respiratory, Cardiovascular, Gastroenterology, Genitourinary, Integumentary, Muscularskeletal, Psychiatric were all negative except for pertinent positives noted in my HPI.    This document serves as a record of the services and decisions personally performed and made by Lisa Bravo MD. It was created on her behalf by Hong Bla, trained medical scribe. The creation of this document is based on the provider's statements to the medical scribe.        Objective    /86 (BP Location: Right arm, Patient Position: Sitting, Cuff Size: Adult Large)   Pulse 68   Temp 98.1  F (36.7  C) (Oral)   Resp 18   Ht 1.873 m (6' 1.75\")   Wt 93.9 kg (207 lb)   SpO2 97%   BMI 26.76 kg/m    Body mass index is 26.76 kg/m .  Physical Exam   GENERAL: overweight, alert and no distress  HENT: ear canals and TM's normal, nose and mouth without ulcers or lesions  NECK:  Incision healing well from subtotal thyroidectomy  RESP: lungs clear to auscultation - no rales, rhonchi or wheezes  CV: regular rate and rhythm, normal S1 S2, no S3 or S4, no murmur, click or rub, no peripheral edema and peripheral pulses strong  MS: no gross musculoskeletal defects noted, no edema.  Tenderness moderate with muscle spasm to the lower thoracic/upper lumbar area left of midline.  SKIN: no suspicious lesions or rashes  BACK: no CVA tenderness,     Diagnostic Test Results:  Labs reviewed in Epic            Assessment & Plan     1. Back muscle spasm  Refill sent, begin medication for 3-5 days then use as needed   NSAIDs for antiinflammatory effect short term.  - cyclobenzaprine (FLEXERIL) 10 MG tablet; Take 1 tablet (10 mg) by mouth nightly as needed for muscle spasms  Dispense: 15 tablet; Refill: 0      2. Malignant neoplasm of thyroid gland (H)  Recent surgery - contained in thyroid gland removed.  Follow up with " "endocrinology later this week with labs planned.      BMI:   Estimated body mass index is 26.76 kg/m  as calculated from the following:    Height as of this encounter: 1.873 m (6' 1.75\").    Weight as of this encounter: 93.9 kg (207 lb).   Weight management plan: Discussed healthy diet and exercise guidelines        Patient Instructions     Continue the Ibuprofen 600 mg three times a day for 2-4 days then as needed up to three times a day.    Flexeril 10 mg at night for muscle spasm - use for the next 3-5 days and then as needed.        Return in about 2 weeks (around 12/2/2019) for as needed for persistent symptoms.    Length of visit was 20 minutes with more than 50 percent of that time used for discussing medical concerns and education    The information in this document, created by the medical scribe, Hong Bal, for me, accurately reflects the services I personally performed and the decisions made by me. I have reviewed and approved this document for accuracy prior to leaving the patient care area. 3:42 PM 11/18/2019    Lisa Bravo MD  [facility]      "

## 2019-11-19 PROBLEM — C73 MALIGNANT NEOPLASM OF THYROID GLAND (H): Status: ACTIVE | Noted: 2019-11-19

## 2019-11-19 PROBLEM — M62.830 BACK MUSCLE SPASM: Status: ACTIVE | Noted: 2019-11-19

## 2019-11-19 ASSESSMENT — ANXIETY QUESTIONNAIRES: GAD7 TOTAL SCORE: 7

## 2020-01-01 ENCOUNTER — TELEPHONE (OUTPATIENT)
Dept: FAMILY MEDICINE | Facility: CLINIC | Age: 50
End: 2020-01-01

## 2020-01-01 DIAGNOSIS — F33.0 MILD EPISODE OF RECURRENT MAJOR DEPRESSIVE DISORDER (H): ICD-10-CM

## 2020-01-02 NOTE — TELEPHONE ENCOUNTER
"Requested Prescriptions   Pending Prescriptions Disp Refills     buPROPion (WELLBUTRIN XL) 150 MG 24 hr tablet [Pharmacy Med Name: BUPROPION XL 150MG TABLETS (24 H)] 90 tablet 0     Sig: TAKE 1 TABLET(150 MG) BY MOUTH EVERY MORNING       SSRIs Protocol Failed - 1/1/2020 11:58 AM        Failed - PHQ-9 score less than 5 in past 6 months     Please review last PHQ-9 score.           Passed - Medication is Bupropion     If the medication is Bupropion (Wellbutrin), and the patient is taking for smoking cessation; OK to refill.          Passed - Medication is active on med list        Passed - Patient is age 18 or older        Passed - Recent (6 mo) or future (30 days) visit within the authorizing provider's specialty     Patient had office visit in the last 6 months or has a visit in the next 30 days with authorizing provider or within the authorizing provider's specialty.  See \"Patient Info\" tab in inbasket, or \"Choose Columns\" in Meds & Orders section of the refill encounter.            buPROPion (WELLBUTRIN XL) 150 MG 24 hr tablet  Last Written Prescription Date:  10/2/19  Last Fill Quantity: 90,  # refills: 0   Last office visit: 11/18/2019 with prescribing provider:  Dr. Bravo   Future Office Visit:      PHQ-9 score:    PHQ-9 SCORE 11/18/2019   PHQ-9 Total Score -   PHQ-9 Total Score Anget -   PHQ-9 Total Score 8             BHARGAVI-7 SCORE 2/7/2019 6/5/2019 11/18/2019   Total Score - - -   Total Score 7 7 7         "

## 2020-01-04 RX ORDER — BUPROPION HYDROCHLORIDE 150 MG/1
150 TABLET ORAL EVERY MORNING
Qty: 30 TABLET | Refills: 0 | Status: SHIPPED | OUTPATIENT
Start: 2020-01-04 | End: 2021-07-26

## 2020-01-04 NOTE — TELEPHONE ENCOUNTER
Routing refill request to provider for review/approval because:  PHQ-9 score:    PHQ-9 SCORE 11/18/2019   PHQ-9 Total Score -   PHQ-9 Total Score MyChart -   PHQ-9 Total Score 8

## 2020-06-16 DIAGNOSIS — E78.5 HYPERLIPIDEMIA LDL GOAL <130: ICD-10-CM

## 2020-06-17 NOTE — TELEPHONE ENCOUNTER
Routing refill request to provider for review/approval because:  A break in medication  Nancy Mcintyre RN

## 2020-06-18 RX ORDER — ATORVASTATIN CALCIUM 10 MG/1
10 TABLET, FILM COATED ORAL DAILY
Qty: 30 TABLET | Refills: 0 | Status: SHIPPED | OUTPATIENT
Start: 2020-06-18 | End: 2020-07-22

## 2020-07-06 ENCOUNTER — MYC MEDICAL ADVICE (OUTPATIENT)
Dept: FAMILY MEDICINE | Facility: CLINIC | Age: 50
End: 2020-07-06

## 2020-07-18 DIAGNOSIS — E78.5 HYPERLIPIDEMIA LDL GOAL <130: ICD-10-CM

## 2020-07-21 NOTE — TELEPHONE ENCOUNTER
Routing refill request to provider for review/approval because:  Protocol failed  Marii Dudley RN

## 2020-07-22 RX ORDER — ATORVASTATIN CALCIUM 10 MG/1
TABLET, FILM COATED ORAL
Qty: 30 TABLET | Refills: 0 | Status: SHIPPED | OUTPATIENT
Start: 2020-07-22 | End: 2020-08-23

## 2020-07-22 NOTE — TELEPHONE ENCOUNTER
Has lab appointment later this week.  Will send a 1 additional month to get to that appointment and then additional refills at that time.

## 2020-08-21 DIAGNOSIS — Z00.00 ROUTINE GENERAL MEDICAL EXAMINATION AT A HEALTH CARE FACILITY: ICD-10-CM

## 2020-08-21 DIAGNOSIS — Z12.5 SCREENING FOR PROSTATE CANCER: ICD-10-CM

## 2020-08-21 DIAGNOSIS — Z13.1 SCREENING FOR DIABETES MELLITUS: ICD-10-CM

## 2020-08-21 DIAGNOSIS — E89.0 POSTOPERATIVE HYPOTHYROIDISM: ICD-10-CM

## 2020-08-21 DIAGNOSIS — E78.5 HYPERLIPIDEMIA LDL GOAL <130: ICD-10-CM

## 2020-08-21 DIAGNOSIS — C73 MALIGNANT NEOPLASM OF THYROID GLAND (H): ICD-10-CM

## 2020-08-21 LAB
ERYTHROCYTE [DISTWIDTH] IN BLOOD BY AUTOMATED COUNT: 12.3 % (ref 10–15)
HCT VFR BLD AUTO: 42.8 % (ref 40–53)
HGB BLD-MCNC: 15.4 G/DL (ref 13.3–17.7)
MCH RBC QN AUTO: 30.6 PG (ref 26.5–33)
MCHC RBC AUTO-ENTMCNC: 36 G/DL (ref 31.5–36.5)
MCV RBC AUTO: 85 FL (ref 78–100)
PLATELET # BLD AUTO: 154 10E9/L (ref 150–450)
PTH-INTACT SERPL-MCNC: 36 PG/ML (ref 18–80)
RBC # BLD AUTO: 5.04 10E12/L (ref 4.4–5.9)
WBC # BLD AUTO: 4.4 10E9/L (ref 4–11)

## 2020-08-21 PROCEDURE — 85027 COMPLETE CBC AUTOMATED: CPT | Performed by: FAMILY MEDICINE

## 2020-08-21 PROCEDURE — 84439 ASSAY OF FREE THYROXINE: CPT | Performed by: FAMILY MEDICINE

## 2020-08-21 PROCEDURE — 80053 COMPREHEN METABOLIC PANEL: CPT | Performed by: FAMILY MEDICINE

## 2020-08-21 PROCEDURE — 83970 ASSAY OF PARATHORMONE: CPT | Performed by: FAMILY MEDICINE

## 2020-08-21 PROCEDURE — G0103 PSA SCREENING: HCPCS | Performed by: FAMILY MEDICINE

## 2020-08-21 PROCEDURE — 36415 COLL VENOUS BLD VENIPUNCTURE: CPT | Performed by: FAMILY MEDICINE

## 2020-08-21 PROCEDURE — 84443 ASSAY THYROID STIM HORMONE: CPT | Performed by: FAMILY MEDICINE

## 2020-08-21 PROCEDURE — 80061 LIPID PANEL: CPT | Performed by: FAMILY MEDICINE

## 2020-08-22 LAB
ALBUMIN SERPL-MCNC: 4.1 G/DL (ref 3.4–5)
ALP SERPL-CCNC: 65 U/L (ref 40–150)
ALT SERPL W P-5'-P-CCNC: 51 U/L (ref 0–70)
ANION GAP SERPL CALCULATED.3IONS-SCNC: 9 MMOL/L (ref 3–14)
AST SERPL W P-5'-P-CCNC: 29 U/L (ref 0–45)
BILIRUB SERPL-MCNC: 0.9 MG/DL (ref 0.2–1.3)
BUN SERPL-MCNC: 20 MG/DL (ref 7–30)
CALCIUM SERPL-MCNC: 8.7 MG/DL (ref 8.5–10.1)
CHLORIDE SERPL-SCNC: 106 MMOL/L (ref 94–109)
CHOLEST SERPL-MCNC: 177 MG/DL
CO2 SERPL-SCNC: 23 MMOL/L (ref 20–32)
CREAT SERPL-MCNC: 1 MG/DL (ref 0.66–1.25)
GFR SERPL CREATININE-BSD FRML MDRD: 87 ML/MIN/{1.73_M2}
GLUCOSE SERPL-MCNC: 95 MG/DL (ref 70–99)
HDLC SERPL-MCNC: 50 MG/DL
LDLC SERPL CALC-MCNC: 99 MG/DL
NONHDLC SERPL-MCNC: 127 MG/DL
POTASSIUM SERPL-SCNC: 4.5 MMOL/L (ref 3.4–5.3)
PROT SERPL-MCNC: 7.8 G/DL (ref 6.8–8.8)
PSA SERPL-ACNC: 0.95 UG/L (ref 0–4)
SODIUM SERPL-SCNC: 138 MMOL/L (ref 133–144)
T4 FREE SERPL-MCNC: 1.38 NG/DL (ref 0.76–1.46)
TRIGL SERPL-MCNC: 142 MG/DL
TSH SERPL DL<=0.005 MIU/L-ACNC: 0.23 MU/L (ref 0.4–4)

## 2020-08-23 NOTE — RESULT ENCOUNTER NOTE
Thyroid testing indicates normal active thyroid hormone.  Your cholesterol is under good control and a refill of your prescription is being sent to the pharmacy.  Your blood sugar, kidney function, and liver function are all normal.  Prostate cancer screening is negative/normal.  Your parathyroid hormone which was abnormal following your surgery is currently normal now.  Your blood cell counts are normal.  Please call or MyChart message me if you have any questions.    JARRELLK

## 2020-12-06 ENCOUNTER — HEALTH MAINTENANCE LETTER (OUTPATIENT)
Age: 50
End: 2020-12-06

## 2021-07-23 ASSESSMENT — ENCOUNTER SYMPTOMS
HEMATOCHEZIA: 0
PARESTHESIAS: 0
ABDOMINAL PAIN: 0
SHORTNESS OF BREATH: 0
HEADACHES: 0
SORE THROAT: 0
CONSTIPATION: 0
MYALGIAS: 0
DYSURIA: 0
PALPITATIONS: 0
FREQUENCY: 0
DIZZINESS: 0
NERVOUS/ANXIOUS: 0
JOINT SWELLING: 0
HEARTBURN: 0
NAUSEA: 0
DIARRHEA: 0
HEMATURIA: 0
ARTHRALGIAS: 0
EYE PAIN: 0
WEAKNESS: 0
COUGH: 0
CHILLS: 0
FEVER: 0

## 2021-07-26 ENCOUNTER — OFFICE VISIT (OUTPATIENT)
Dept: FAMILY MEDICINE | Facility: CLINIC | Age: 51
End: 2021-07-26
Payer: COMMERCIAL

## 2021-07-26 VITALS
TEMPERATURE: 98.2 F | SYSTOLIC BLOOD PRESSURE: 126 MMHG | HEART RATE: 77 BPM | OXYGEN SATURATION: 96 % | HEIGHT: 74 IN | DIASTOLIC BLOOD PRESSURE: 86 MMHG | BODY MASS INDEX: 29.44 KG/M2 | WEIGHT: 229.4 LBS

## 2021-07-26 DIAGNOSIS — Z12.11 SCREEN FOR COLON CANCER: ICD-10-CM

## 2021-07-26 DIAGNOSIS — Z00.00 ROUTINE GENERAL MEDICAL EXAMINATION AT A HEALTH CARE FACILITY: Primary | ICD-10-CM

## 2021-07-26 DIAGNOSIS — Z13.1 SCREENING FOR DIABETES MELLITUS: ICD-10-CM

## 2021-07-26 DIAGNOSIS — E78.5 HYPERLIPIDEMIA LDL GOAL <130: ICD-10-CM

## 2021-07-26 DIAGNOSIS — Z12.5 SCREENING FOR PROSTATE CANCER: ICD-10-CM

## 2021-07-26 DIAGNOSIS — Z11.59 NEED FOR HEPATITIS C SCREENING TEST: ICD-10-CM

## 2021-07-26 LAB
ALBUMIN SERPL-MCNC: 4.4 G/DL (ref 3.4–5)
ALP SERPL-CCNC: 70 U/L (ref 40–150)
ALT SERPL W P-5'-P-CCNC: 77 U/L (ref 0–70)
ANION GAP SERPL CALCULATED.3IONS-SCNC: 5 MMOL/L (ref 3–14)
AST SERPL W P-5'-P-CCNC: 35 U/L (ref 0–45)
BILIRUB SERPL-MCNC: 0.8 MG/DL (ref 0.2–1.3)
BUN SERPL-MCNC: 18 MG/DL (ref 7–30)
CALCIUM SERPL-MCNC: 9 MG/DL (ref 8.5–10.1)
CHLORIDE BLD-SCNC: 107 MMOL/L (ref 94–109)
CHOLEST SERPL-MCNC: 194 MG/DL
CO2 SERPL-SCNC: 27 MMOL/L (ref 20–32)
CREAT SERPL-MCNC: 0.97 MG/DL (ref 0.66–1.25)
ERYTHROCYTE [DISTWIDTH] IN BLOOD BY AUTOMATED COUNT: 12.5 % (ref 10–15)
FASTING STATUS PATIENT QL REPORTED: YES
GFR SERPL CREATININE-BSD FRML MDRD: 90 ML/MIN/1.73M2
GLUCOSE BLD-MCNC: 100 MG/DL (ref 70–99)
HCT VFR BLD AUTO: 42.8 % (ref 40–53)
HCV AB SERPL QL IA: NONREACTIVE
HDLC SERPL-MCNC: 43 MG/DL
HGB BLD-MCNC: 14.9 G/DL (ref 13.3–17.7)
LDLC SERPL CALC-MCNC: 111 MG/DL
MCH RBC QN AUTO: 29.6 PG (ref 26.5–33)
MCHC RBC AUTO-ENTMCNC: 34.8 G/DL (ref 31.5–36.5)
MCV RBC AUTO: 85 FL (ref 78–100)
NONHDLC SERPL-MCNC: 151 MG/DL
PLATELET # BLD AUTO: 188 10E3/UL (ref 150–450)
POTASSIUM BLD-SCNC: 4.6 MMOL/L (ref 3.4–5.3)
PROT SERPL-MCNC: 8.1 G/DL (ref 6.8–8.8)
PSA SERPL-MCNC: 1.34 UG/L (ref 0–4)
RBC # BLD AUTO: 5.04 10E6/UL (ref 4.4–5.9)
SODIUM SERPL-SCNC: 139 MMOL/L (ref 133–144)
TRIGL SERPL-MCNC: 200 MG/DL
WBC # BLD AUTO: 5 10E3/UL (ref 4–11)

## 2021-07-26 PROCEDURE — G0103 PSA SCREENING: HCPCS | Performed by: FAMILY MEDICINE

## 2021-07-26 PROCEDURE — 80053 COMPREHEN METABOLIC PANEL: CPT | Performed by: FAMILY MEDICINE

## 2021-07-26 PROCEDURE — 80061 LIPID PANEL: CPT | Performed by: FAMILY MEDICINE

## 2021-07-26 PROCEDURE — 86803 HEPATITIS C AB TEST: CPT | Performed by: FAMILY MEDICINE

## 2021-07-26 PROCEDURE — 99396 PREV VISIT EST AGE 40-64: CPT | Performed by: FAMILY MEDICINE

## 2021-07-26 PROCEDURE — 85027 COMPLETE CBC AUTOMATED: CPT | Performed by: FAMILY MEDICINE

## 2021-07-26 PROCEDURE — 36415 COLL VENOUS BLD VENIPUNCTURE: CPT | Performed by: FAMILY MEDICINE

## 2021-07-26 RX ORDER — LEVOTHYROXINE SODIUM 137 UG/1
137 TABLET ORAL DAILY
COMMUNITY
Start: 2021-07-26

## 2021-07-26 RX ORDER — MULTIPLE VITAMINS W/ MINERALS TAB 9MG-400MCG
1 TAB ORAL DAILY
COMMUNITY

## 2021-07-26 ASSESSMENT — ANXIETY QUESTIONNAIRES
7. FEELING AFRAID AS IF SOMETHING AWFUL MIGHT HAPPEN: NOT AT ALL
2. NOT BEING ABLE TO STOP OR CONTROL WORRYING: NOT AT ALL
6. BECOMING EASILY ANNOYED OR IRRITABLE: SEVERAL DAYS
7. FEELING AFRAID AS IF SOMETHING AWFUL MIGHT HAPPEN: NOT AT ALL
GAD7 TOTAL SCORE: 2
GAD7 TOTAL SCORE: 2
3. WORRYING TOO MUCH ABOUT DIFFERENT THINGS: NOT AT ALL
4. TROUBLE RELAXING: SEVERAL DAYS
5. BEING SO RESTLESS THAT IT IS HARD TO SIT STILL: NOT AT ALL
GAD7 TOTAL SCORE: 2
8. IF YOU CHECKED OFF ANY PROBLEMS, HOW DIFFICULT HAVE THESE MADE IT FOR YOU TO DO YOUR WORK, TAKE CARE OF THINGS AT HOME, OR GET ALONG WITH OTHER PEOPLE?: SOMEWHAT DIFFICULT
1. FEELING NERVOUS, ANXIOUS, OR ON EDGE: NOT AT ALL

## 2021-07-26 ASSESSMENT — ENCOUNTER SYMPTOMS
MYALGIAS: 0
WEAKNESS: 0
HEARTBURN: 0
DIARRHEA: 0
NAUSEA: 0
DIZZINESS: 0
CHILLS: 0
CONSTIPATION: 0
HEADACHES: 0
SHORTNESS OF BREATH: 0
JOINT SWELLING: 0
ABDOMINAL PAIN: 0
ARTHRALGIAS: 0
SORE THROAT: 0
FEVER: 0
PALPITATIONS: 0
EYE PAIN: 0
COUGH: 0
HEMATOCHEZIA: 0
NERVOUS/ANXIOUS: 0
FREQUENCY: 0
PARESTHESIAS: 0
HEMATURIA: 0
DYSURIA: 0

## 2021-07-26 ASSESSMENT — MIFFLIN-ST. JEOR: SCORE: 1962.43

## 2021-07-26 ASSESSMENT — PATIENT HEALTH QUESTIONNAIRE - PHQ9
SUM OF ALL RESPONSES TO PHQ QUESTIONS 1-9: 4
10. IF YOU CHECKED OFF ANY PROBLEMS, HOW DIFFICULT HAVE THESE PROBLEMS MADE IT FOR YOU TO DO YOUR WORK, TAKE CARE OF THINGS AT HOME, OR GET ALONG WITH OTHER PEOPLE: SOMEWHAT DIFFICULT
SUM OF ALL RESPONSES TO PHQ QUESTIONS 1-9: 4

## 2021-07-26 NOTE — PROGRESS NOTES
SUBJECTIVE:   CC: Enrike Ricci is an 51 year old male who presents for preventative health visit.       Patient has been advised of split billing requirements and indicates understanding: Yes  Healthy Habits:     Getting at least 3 servings of Calcium per day:  Yes    Bi-annual eye exam:  NO    Dental care twice a year:  Yes    Sleep apnea or symptoms of sleep apnea:  None and Daytime drowsiness    Diet:  Regular (no restrictions)    Frequency of exercise:  2-3 days/week    Duration of exercise:  30-45 minutes    Taking medications regularly:  Yes    Medication side effects:  None    PHQ-2 Total Score: 2    Additional concerns today:  No     Karate class, elliptical    Today's PHQ-2 Score:   PHQ-2 ( 1999 [employer]) 7/23/2021   Q1: Little interest or pleasure in doing things 1   Q2: Feeling down, depressed or hopeless 1   PHQ-2 Score 2   Q1: Little interest or pleasure in doing things Several days   Q2: Feeling down, depressed or hopeless Several days   PHQ-2 Score 2       Abuse: Current or Past(Physical, Sexual or Emotional)- No  Do you feel safe in your environment? Yes    Have you ever done Advance Care Planning? (For example, a Health Directive, POLST, or a discussion with a medical provider or your loved ones about your wishes): No, advance care planning information given to patient to review.  Patient declined advance care planning discussion at this time.    Social History     Tobacco Use     Smoking status: Never Smoker     Smokeless tobacco: Never Used   Substance Use Topics     Alcohol use: No     If you drink alcohol do you typically have >3 drinks per day or >7 drinks per week? No    Alcohol Use 7/26/2021   Prescreen: >3 drinks/day or >7 drinks/week? -   Prescreen: >3 drinks/day or >7 drinks/week? No       Last PSA:   PSA   Date Value Ref Range Status   08/21/2020 0.95 0 - 4 ug/L Final     Comment:     Assay Method:  Chemiluminescence using Siemens Vista analyzer       Reviewed orders with patient.  Reviewed health maintenance and updated orders accordingly - Yes  BP Readings from Last 3 Encounters:   07/26/21 126/86   11/18/19 135/86   09/25/19 135/87    Wt Readings from Last 3 Encounters:   07/26/21 104.1 kg (229 lb 6.4 oz)   11/18/19 93.9 kg (207 lb)   09/25/19 95.9 kg (211 lb 6.4 oz)             Hyperlipidemia Follow-Up      Are you regularly taking any medication or supplement to lower your cholesterol?   Yes- lipitor    Are you having muscle aches or other side effects that you think could be caused by your cholesterol lowering medication?  No           Reviewed and updated as needed this visit by clinical staff  Tobacco  Allergies  Meds   Med Hx  Surg Hx  Fam Hx  Soc Hx        Reviewed and updated as needed this visit by Provider  Tobacco  Allergies    Med Hx  Surg Hx  Fam Hx  Soc Hx       Past Medical History:   Diagnosis Date     Depressive disorder 1987     H/O dysthymia      Heart disease 2014     Malignant neoplasm of thyroid gland (H)      Malignant neoplasm of thyroid gland (H)       Past Surgical History:   Procedure Laterality Date     ARTHROSCOPY KNEE       EYE SURGERY Left as a child     HEAD & NECK SURGERY  2019    Thyroid       Review of Systems   Constitutional: Negative for chills and fever.   HENT: Negative for congestion, ear pain, hearing loss and sore throat.    Eyes: Negative for pain and visual disturbance.   Respiratory: Negative for cough and shortness of breath.    Cardiovascular: Negative for chest pain, palpitations and peripheral edema.   Gastrointestinal: Negative for abdominal pain, constipation, diarrhea, heartburn, hematochezia and nausea.   Genitourinary: Negative for discharge, dysuria, frequency, genital sores, hematuria, impotence and urgency.   Musculoskeletal: Negative for arthralgias, joint swelling and myalgias.   Skin: Negative for rash.   Neurological: Negative for dizziness, weakness, headaches and paresthesias.   Psychiatric/Behavioral: Negative for  "mood changes. The patient is not nervous/anxious.          OBJECTIVE:   /86 (BP Location: Right arm, Patient Position: Sitting, Cuff Size: Adult Regular)   Pulse 77   Temp 98.2  F (36.8  C) (Oral)   Ht 1.875 m (6' 1.82\")   Wt 104.1 kg (229 lb 6.4 oz)   SpO2 96%   BMI 29.60 kg/m      Physical Exam  GENERAL: healthy, alert and no distress  EYES: Eyes grossly normal to inspection, PERRL and conjunctivae and sclerae normal  HENT: ear canals and TM's normal, nose and mouth without ulcers or lesions  NECK: no adenopathy, no asymmetry, masses, or scars and thyroid normal to palpation  RESP: lungs clear to auscultation - no rales, rhonchi or wheezes  CV: regular rate and rhythm, normal S1 S2, no S3 or S4, no murmur, click or rub, no peripheral edema and peripheral pulses strong  ABDOMEN: soft, nontender, no hepatosplenomegaly, no masses and bowel sounds normal  RECTAL: normal sphincter tone, no rectal masses, prostate normal size, smooth, nontender without nodules or masses  MS: no gross musculoskeletal defects noted, no edema  SKIN: no suspicious lesions or rashes  NEURO: Normal strength and tone, mentation intact and speech normal  PSYCH: mentation appears normal, affect normal/bright    Diagnostic Test Results:  Labs reviewed in Epic    ASSESSMENT/PLAN:   1. Routine general medical examination at a health care facility  Screening and preventative care discussed.   - Comprehensive metabolic panel (BMP + Alb, Alk Phos, ALT, AST, Total. Bili, TP); Future  - CBC with platelets; Future    2. Screen for colon cancer  Referral for screening.  - Adult Gastro Ref - Procedure Only; Future    3. Need for hepatitis C screening test  Screening    - Hepatitis C Screen Reflex to HCV RNA Quant and Genotype; Future    4. Hyperlipidemia LDL goal <130  Refill lipitor when results return.  - Lipid panel reflex to direct LDL Fasting; Future    6. Screening for diabetes mellitus  screening  - Comprehensive metabolic panel (BMP + " "Alb, Alk Phos, ALT, AST, Total. Bili, TP); Future    7. Screening for prostate cancer  screening  - PSA, screen; Future    Patient has been advised of split billing requirements and indicates understanding: Yes  COUNSELING:   Reviewed preventive health counseling, as reflected in patient instructions       Regular exercise       Healthy diet/nutrition       Vision screening       Consider Hep C screening for all patients one time for ages 18-79 years       Colon cancer screening       Prostate cancer screening       Osteoporosis prevention/bone health    Estimated body mass index is 29.6 kg/m  as calculated from the following:    Height as of this encounter: 1.875 m (6' 1.82\").    Weight as of this encounter: 104.1 kg (229 lb 6.4 oz).     Weight management plan: Discussed healthy diet and exercise guidelines    He reports that he has never smoked. He has never used smokeless tobacco.      Counseling Resources:  ATP IV Guidelines  Pooled Cohorts Equation Calculator  FRAX Risk Assessment  ICSI Preventive Guidelines  Dietary Guidelines for Americans, 2010  [employer]'s MyPlate  ASA Prophylaxis  Lung CA Screening    Lisa Bravo MD  [facility]  Answers for HPI/ROS submitted by the patient on 7/26/2021  If you checked off any problems, how difficult have these problems made it for you to do your work, take care of things at home, or get along with other people?: Somewhat difficult  PHQ9 TOTAL SCORE: 4  BHARGAVI 7 TOTAL SCORE: 2      "

## 2021-07-26 NOTE — PATIENT INSTRUCTIONS
Fasting labs today.  Refill Lipitor will be sent when results return.  Colon cancer screening:  You can call [phone] to schedule this at the [facility] building in [place]      I would recommend the shingles vaccine.  This is a series of 2 vaccines, 2-6 months apart.  You may want to verify coverage with your insurance.  It may be covered better at the pharmacy.         Preventive Health Recommendations  Male Ages 50 - 64    Yearly exam:             See your health care provider every year in order to  o   Review health changes.   o   Discuss preventive care.    o   Review your medicines if your doctor has prescribed any.     Have a cholesterol test every 5 years, or more frequently if you are at risk for high cholesterol/heart disease.     Have a diabetes test (fasting glucose) every three years. If you are at risk for diabetes, you should have this test more often.     Have a colonoscopy at age 50, or have a yearly FIT test (stool test). These exams will check for colon cancer.      Talk with your health care provider about whether or not a prostate cancer screening test (PSA) is right for you.    You should be tested each year for STDs (sexually transmitted diseases), if you re at risk.     Shots: Get a flu shot each year. Get a tetanus shot every 10 years.     Nutrition:    Eat at least 5 servings of fruits and vegetables daily.     Eat whole-grain bread, whole-wheat pasta and brown rice instead of white grains and rice.     Get adequate Calcium and Vitamin D.     Lifestyle    Exercise for at least 150 minutes a week (30 minutes a day, 5 days a week). This will help you control your weight and prevent disease.     Limit alcohol to one drink per day.     No smoking.     Wear sunscreen to prevent skin cancer.     See your dentist every six months for an exam and cleaning.     See your eye doctor every 1 to 2 years.

## 2021-07-27 ASSESSMENT — ANXIETY QUESTIONNAIRES: GAD7 TOTAL SCORE: 2

## 2021-07-28 DIAGNOSIS — E78.5 HYPERLIPIDEMIA LDL GOAL <130: ICD-10-CM

## 2021-07-28 DIAGNOSIS — R79.89 ELEVATED LFTS: Primary | ICD-10-CM

## 2021-07-28 RX ORDER — ATORVASTATIN CALCIUM 10 MG/1
10 TABLET, FILM COATED ORAL DAILY
Qty: 90 TABLET | Refills: 3 | Status: SHIPPED | OUTPATIENT
Start: 2021-07-28 | End: 2022-09-01

## 2021-07-28 NOTE — RESULT ENCOUNTER NOTE
"Your hepatitis screening is negative/normal.  The prostate cancer screening test is normal/negative.  Your blood sugar is borderline elevated.  This is in the \"prediabetic\" range.  Triglyceride level is also elevated. Exercise and limiting carbohydrate and sugars in diet can be helpful at preventing progression to diabetes and control the triglycerides. The remainder of the cholesterol levels are in an acceptable range with your cholesterol-lowering medication. Refill of that is being updated now  Your kidney function is normal. There is one mildly abnormal liver test. This could be related to the elevated triglycerides.  I would recommend a follow up lab in 3 months to see if it has normalized or remains elevated. You do not need to fast for that lab appointment.  Your blood cell counts are normal.  Please call or MyChart message me if you have any questions.       PSK    The 10-year ASCVD risk score (Olivia MORGAN Jr., et al., 2013) is: 4.2%    Values used to calculate the score:      Age: 51 years      Sex: Male      Is Non- : No      Diabetic: No      Tobacco smoker: No      Systolic Blood Pressure: 126 mmHg      Is BP treated: No      HDL Cholesterol: 43 mg/dL      Total Cholesterol: 194 mg/dL    "

## 2021-08-18 ENCOUNTER — TELEPHONE (OUTPATIENT)
Dept: FAMILY MEDICINE | Facility: CLINIC | Age: 51
End: 2021-08-18

## 2021-08-18 NOTE — TELEPHONE ENCOUNTER
Patient Quality Outreach      Summary:    Patient has the following on his problem list/HM: None    Patient is due/failing the following:   Colonoscopy    Type of outreach:    Sent [employer] message.    Questions for provider review:    None                                                                                                                                     Mariann Aguilar

## 2021-09-26 ENCOUNTER — HEALTH MAINTENANCE LETTER (OUTPATIENT)
Age: 51
End: 2021-09-26

## 2022-04-28 ENCOUNTER — OFFICE VISIT (OUTPATIENT)
Dept: FAMILY MEDICINE | Facility: CLINIC | Age: 52
End: 2022-04-28
Payer: COMMERCIAL

## 2022-04-28 VITALS
RESPIRATION RATE: 16 BRPM | SYSTOLIC BLOOD PRESSURE: 118 MMHG | HEART RATE: 68 BPM | OXYGEN SATURATION: 98 % | DIASTOLIC BLOOD PRESSURE: 72 MMHG | WEIGHT: 229 LBS | TEMPERATURE: 97.7 F | BODY MASS INDEX: 29.39 KG/M2 | HEIGHT: 74 IN

## 2022-04-28 DIAGNOSIS — H68.103 EUSTACHIAN TUBE OBSTRUCTION, BILATERAL: Primary | ICD-10-CM

## 2022-04-28 PROCEDURE — 99213 OFFICE O/P EST LOW 20 MIN: CPT | Performed by: FAMILY MEDICINE

## 2022-04-28 ASSESSMENT — PATIENT HEALTH QUESTIONNAIRE - PHQ9
SUM OF ALL RESPONSES TO PHQ QUESTIONS 1-9: 4
SUM OF ALL RESPONSES TO PHQ QUESTIONS 1-9: 4
10. IF YOU CHECKED OFF ANY PROBLEMS, HOW DIFFICULT HAVE THESE PROBLEMS MADE IT FOR YOU TO DO YOUR WORK, TAKE CARE OF THINGS AT HOME, OR GET ALONG WITH OTHER PEOPLE: NOT DIFFICULT AT ALL

## 2022-04-28 ASSESSMENT — PAIN SCALES - GENERAL: PAINLEVEL: MILD PAIN (3)

## 2022-04-28 NOTE — PROGRESS NOTES
"  Assessment & Plan     Eustachian tube obstruction, bilateral  Advised with supportive care.  Advised patient to use  Flonase nasal spray, Zyrtec or any antihistamine medication.  Discussed with patient if that does not help.    Call the office , and possible prescription for oral prednisone, and or antibiotics.    BMI:   Estimated body mass index is 29.7 kg/m  as calculated from the following:    Height as of this encounter: 1.87 m (6' 1.62\").    Weight as of this encounter: 103.9 kg (229 lb).   Weight management plan: Discussed healthy diet and exercise guidelines    Work on weight loss  Regular exercise    No follow-ups on file.    Yuniel Bertrand MD  [facility]    Kiran Portillo is a 52 year old who presents for the following health issues:  Patient reports he was on a trip traveling and had scuba diving.  Since that time his ears, been full, pressured, muffled sound.  He has no pain, no discharges,  He has no sneezing, no sinus congestion.  Denies nasal drainage,    History of Present Illness       Mental Health Follow-up:                    Today's PHQ-9         PHQ-9 Total Score: 4  PHQ-9 Q9 Thoughts of better off dead/self-harm past 2 weeks :   (P) Not at all    How difficult have these problems made it for you to do your work, take care of things at home, or get along with other people: Not difficult at all        Reason for visit:  Ear injury  Symptom onset:  1-2 weeks ago  Symptoms include:  Ear pain  Symptom intensity:  Mild  Symptom progression:  Improving  Had these symptoms before:  No  What makes it worse:  No  What makes it better:  No    He eats 2-3 servings of fruits and vegetables daily.He consumes 0 sweetened beverage(s) daily.He exercises with enough effort to increase his heart rate 30 to 60 minutes per day.  He exercises with enough effort to increase his heart rate 3 or less days per week.   He is taking medications regularly.       Review of Systems "   Constitutional, HEENT, cardiovascular, pulmonary, GI, , musculoskeletal, neuro, skin, endocrine and psych systems are negative, except as otherwise noted.      Objective    There were no vitals taken for this visit.  There is no height or weight on file to calculate BMI.  Physical Exam   GENERAL: healthy, alert and no distress  HENT: ear canals and TM's normal, nose and mouth without ulcers or lesions  NECK: no adenopathy, no asymmetry, masses, or scars and thyroid normal to palpation  RESP: lungs clear to auscultation - no rales, rhonchi or wheezes  PSYCH: mentation appears normal, affect normal/bright        Yuniel Bertrand MD

## 2022-04-29 ASSESSMENT — PATIENT HEALTH QUESTIONNAIRE - PHQ9: SUM OF ALL RESPONSES TO PHQ QUESTIONS 1-9: 4

## 2022-08-27 ENCOUNTER — HEALTH MAINTENANCE LETTER (OUTPATIENT)
Age: 52
End: 2022-08-27

## 2022-09-01 DIAGNOSIS — E78.5 HYPERLIPIDEMIA LDL GOAL <130: ICD-10-CM

## 2022-09-01 RX ORDER — ATORVASTATIN CALCIUM 10 MG/1
TABLET, FILM COATED ORAL
Qty: 90 TABLET | Refills: 0 | Status: SHIPPED | OUTPATIENT
Start: 2022-09-01 | End: 2022-12-02

## 2022-09-01 NOTE — TELEPHONE ENCOUNTER
Medication is being filled for 1 time refill only due to:  Due for visit and labs for additional refills. Over 1 year since last visit.   90 day abdullahi given.    Mary Agudelo RN  [facility]

## 2022-11-30 DIAGNOSIS — E78.5 HYPERLIPIDEMIA LDL GOAL <130: ICD-10-CM

## 2022-12-01 NOTE — TELEPHONE ENCOUNTER
Patient needs to be seen by Lisa Bravo MD and lab  (provider or resource)  Is there a time frame in which the patient should be seen Yes: within 6 wks  What does the patient need to be seen regarding Hyperlipid,  annual  Does patient need to come fasting Yes  Phone: [phone] (home)  When workflow completed Route to provider if refill needed to get to follow up appointment     Clinic: [facility] at [phone]    'Hi, this is (your name) I am calling from (provider's name) office. After reviewing your chart, (Provider's name) has indicated that you need an appointment to review (reason for visit). May I assist you in making this appointment? (Please contact us via [employer] or by phone at (Clinic name and Phone number) to schedule this appointment at your earliest convenience)'    Was first outreach attempted?No  If yes, the Second attempt due on:

## 2022-12-02 RX ORDER — ATORVASTATIN CALCIUM 10 MG/1
TABLET, FILM COATED ORAL
Qty: 90 TABLET | Refills: 0 | Status: SHIPPED | OUTPATIENT
Start: 2022-12-02 | End: 2023-04-27

## 2023-02-22 ENCOUNTER — DOCUMENTATION ONLY (OUTPATIENT)
Dept: LAB | Facility: CLINIC | Age: 53
End: 2023-02-22
Payer: COMMERCIAL

## 2023-02-22 DIAGNOSIS — Z00.00 ROUTINE GENERAL MEDICAL EXAMINATION AT A HEALTH CARE FACILITY: ICD-10-CM

## 2023-02-22 DIAGNOSIS — E89.0 POSTOPERATIVE HYPOTHYROIDISM: ICD-10-CM

## 2023-02-22 DIAGNOSIS — Z13.1 SCREENING FOR DIABETES MELLITUS: ICD-10-CM

## 2023-02-22 DIAGNOSIS — E78.5 HYPERLIPIDEMIA LDL GOAL <130: Primary | ICD-10-CM

## 2023-02-22 DIAGNOSIS — Z12.5 SCREENING FOR PROSTATE CANCER: ICD-10-CM

## 2023-02-22 NOTE — PROGRESS NOTES
Patient have Lab appointment on 3/24/23 and there is no orders. Please order test if needed.  Thanks  Laura Stanley MLT([facility])

## 2023-04-22 ENCOUNTER — HEALTH MAINTENANCE LETTER (OUTPATIENT)
Age: 53
End: 2023-04-22

## 2023-04-27 ENCOUNTER — MYC REFILL (OUTPATIENT)
Dept: FAMILY MEDICINE | Facility: CLINIC | Age: 53
End: 2023-04-27

## 2023-04-27 DIAGNOSIS — E78.5 HYPERLIPIDEMIA LDL GOAL <130: ICD-10-CM

## 2023-04-28 RX ORDER — ATORVASTATIN CALCIUM 10 MG/1
10 TABLET, FILM COATED ORAL DAILY
Qty: 90 TABLET | Refills: 0 | Status: SHIPPED | OUTPATIENT
Start: 2023-04-28 | End: 2023-05-22

## 2023-05-21 DIAGNOSIS — E78.5 HYPERLIPIDEMIA LDL GOAL <130: ICD-10-CM

## 2023-05-22 RX ORDER — ATORVASTATIN CALCIUM 10 MG/1
10 TABLET, FILM COATED ORAL DAILY
Qty: 90 TABLET | Refills: 0 | Status: SHIPPED | OUTPATIENT
Start: 2023-05-22 | End: 2023-07-10

## 2023-07-05 ENCOUNTER — LAB (OUTPATIENT)
Dept: LAB | Facility: CLINIC | Age: 53
End: 2023-07-05
Payer: COMMERCIAL

## 2023-07-05 DIAGNOSIS — Z13.1 SCREENING FOR DIABETES MELLITUS: ICD-10-CM

## 2023-07-05 DIAGNOSIS — E78.5 HYPERLIPIDEMIA LDL GOAL <130: ICD-10-CM

## 2023-07-05 DIAGNOSIS — Z12.5 SCREENING FOR PROSTATE CANCER: ICD-10-CM

## 2023-07-05 DIAGNOSIS — Z00.00 ROUTINE GENERAL MEDICAL EXAMINATION AT A HEALTH CARE FACILITY: ICD-10-CM

## 2023-07-05 DIAGNOSIS — E89.0 POSTOPERATIVE HYPOTHYROIDISM: ICD-10-CM

## 2023-07-05 LAB
ALBUMIN SERPL BCG-MCNC: 4.8 G/DL (ref 3.5–5.2)
ALP SERPL-CCNC: 65 U/L (ref 40–129)
ALT SERPL W P-5'-P-CCNC: 47 U/L (ref 0–70)
ANION GAP SERPL CALCULATED.3IONS-SCNC: 10 MMOL/L (ref 7–15)
AST SERPL W P-5'-P-CCNC: 32 U/L (ref 0–45)
BILIRUB SERPL-MCNC: 0.6 MG/DL
BUN SERPL-MCNC: 20.6 MG/DL (ref 6–20)
CALCIUM SERPL-MCNC: 9.3 MG/DL (ref 8.6–10)
CHLORIDE SERPL-SCNC: 104 MMOL/L (ref 98–107)
CHOLEST SERPL-MCNC: 183 MG/DL
CREAT SERPL-MCNC: 1.03 MG/DL (ref 0.67–1.17)
DEPRECATED HCO3 PLAS-SCNC: 26 MMOL/L (ref 22–29)
ERYTHROCYTE [DISTWIDTH] IN BLOOD BY AUTOMATED COUNT: 12.1 % (ref 10–15)
GFR SERPL CREATININE-BSD FRML MDRD: 87 ML/MIN/1.73M2
GLUCOSE SERPL-MCNC: 105 MG/DL (ref 70–99)
HCT VFR BLD AUTO: 45.2 % (ref 40–53)
HDLC SERPL-MCNC: 42 MG/DL
HGB BLD-MCNC: 15.5 G/DL (ref 13.3–17.7)
LDLC SERPL CALC-MCNC: 117 MG/DL
MCH RBC QN AUTO: 29.8 PG (ref 26.5–33)
MCHC RBC AUTO-ENTMCNC: 34.3 G/DL (ref 31.5–36.5)
MCV RBC AUTO: 87 FL (ref 78–100)
NONHDLC SERPL-MCNC: 141 MG/DL
PLATELET # BLD AUTO: 208 10E3/UL (ref 150–450)
POTASSIUM SERPL-SCNC: 4.6 MMOL/L (ref 3.4–5.3)
PROT SERPL-MCNC: 7.6 G/DL (ref 6.4–8.3)
PSA SERPL DL<=0.01 NG/ML-MCNC: 1.05 NG/ML (ref 0–3.5)
RBC # BLD AUTO: 5.2 10E6/UL (ref 4.4–5.9)
SODIUM SERPL-SCNC: 140 MMOL/L (ref 136–145)
TRIGL SERPL-MCNC: 119 MG/DL
TSH SERPL DL<=0.005 MIU/L-ACNC: 1.06 UIU/ML (ref 0.3–4.2)
WBC # BLD AUTO: 4.6 10E3/UL (ref 4–11)

## 2023-07-05 PROCEDURE — 36415 COLL VENOUS BLD VENIPUNCTURE: CPT

## 2023-07-05 PROCEDURE — G0103 PSA SCREENING: HCPCS

## 2023-07-05 PROCEDURE — 85027 COMPLETE CBC AUTOMATED: CPT

## 2023-07-05 PROCEDURE — 80061 LIPID PANEL: CPT

## 2023-07-05 PROCEDURE — 84443 ASSAY THYROID STIM HORMONE: CPT

## 2023-07-05 PROCEDURE — 80053 COMPREHEN METABOLIC PANEL: CPT

## 2023-07-05 NOTE — RESULT ENCOUNTER NOTE
Your blood cell counts are normal.  Your thyroid testing indicates you are on the correct dosage of medication.  Your prostate cancer screening is negative.  Your kidney and liver testing are normal.  Your blood sugar is elevated - this would be normal if you were not fasting for this appointment.  If you were fasting - this is in the prediabetes range.   Exercise and limiting carbohydrate and sugars in diet can be helpful at preventing progression to diabetes.   Your cholesterol levels are similar to previous.  Triglyceride level is improved compared with previous.  Overall risk for heart disease is low based on these and other risk factor assessment.    Please call or MyChart message me if you have any questions.      PSK

## 2023-07-10 ENCOUNTER — OFFICE VISIT (OUTPATIENT)
Dept: FAMILY MEDICINE | Facility: CLINIC | Age: 53
End: 2023-07-10
Payer: COMMERCIAL

## 2023-07-10 VITALS
HEIGHT: 75 IN | TEMPERATURE: 98.4 F | WEIGHT: 225.38 LBS | SYSTOLIC BLOOD PRESSURE: 111 MMHG | DIASTOLIC BLOOD PRESSURE: 77 MMHG | OXYGEN SATURATION: 96 % | BODY MASS INDEX: 28.02 KG/M2 | HEART RATE: 81 BPM | RESPIRATION RATE: 20 BRPM

## 2023-07-10 DIAGNOSIS — E78.5 HYPERLIPIDEMIA LDL GOAL <130: ICD-10-CM

## 2023-07-10 DIAGNOSIS — E89.0 POSTOPERATIVE HYPOTHYROIDISM: ICD-10-CM

## 2023-07-10 DIAGNOSIS — C73 MALIGNANT NEOPLASM OF THYROID GLAND (H): ICD-10-CM

## 2023-07-10 DIAGNOSIS — Z00.00 ROUTINE GENERAL MEDICAL EXAMINATION AT A HEALTH CARE FACILITY: Primary | ICD-10-CM

## 2023-07-10 DIAGNOSIS — Z23 NEED FOR TDAP VACCINATION: ICD-10-CM

## 2023-07-10 PROCEDURE — 90715 TDAP VACCINE 7 YRS/> IM: CPT | Performed by: FAMILY MEDICINE

## 2023-07-10 PROCEDURE — 99396 PREV VISIT EST AGE 40-64: CPT | Mod: 25 | Performed by: FAMILY MEDICINE

## 2023-07-10 PROCEDURE — 90471 IMMUNIZATION ADMIN: CPT | Performed by: FAMILY MEDICINE

## 2023-07-10 RX ORDER — ATORVASTATIN CALCIUM 10 MG/1
10 TABLET, FILM COATED ORAL DAILY
Qty: 90 TABLET | Refills: 3 | Status: SHIPPED | OUTPATIENT
Start: 2023-07-10 | End: 2024-08-17

## 2023-07-10 ASSESSMENT — ENCOUNTER SYMPTOMS
CHILLS: 0
CONSTIPATION: 0
SORE THROAT: 0
JOINT SWELLING: 0
HEMATOCHEZIA: 0
DYSURIA: 0
HEMATURIA: 0
FEVER: 0
NERVOUS/ANXIOUS: 0
ARTHRALGIAS: 0
DIARRHEA: 0
NAUSEA: 0
HEARTBURN: 0
ABDOMINAL PAIN: 0
WEAKNESS: 0
MYALGIAS: 0
SHORTNESS OF BREATH: 0
FREQUENCY: 0
PARESTHESIAS: 0
HEADACHES: 0
DIZZINESS: 0
COUGH: 0
EYE PAIN: 0
PALPITATIONS: 0

## 2023-07-10 ASSESSMENT — PATIENT HEALTH QUESTIONNAIRE - PHQ9
10. IF YOU CHECKED OFF ANY PROBLEMS, HOW DIFFICULT HAVE THESE PROBLEMS MADE IT FOR YOU TO DO YOUR WORK, TAKE CARE OF THINGS AT HOME, OR GET ALONG WITH OTHER PEOPLE: SOMEWHAT DIFFICULT
SUM OF ALL RESPONSES TO PHQ QUESTIONS 1-9: 8
SUM OF ALL RESPONSES TO PHQ QUESTIONS 1-9: 8

## 2023-07-10 ASSESSMENT — PAIN SCALES - GENERAL: PAINLEVEL: NO PAIN (0)

## 2023-07-10 NOTE — PATIENT INSTRUCTIONS
Booster tetanus vaccine today.    Colonoscopy due in 2025.    Consider shingles vaccine at a later date.   This is a series of 2 vaccines, 2-6 months apart.  You will want to verify coverage with your insurance.  It may be covered better at the pharmacy and can have some side effects for a few days after the immunization.        Preventive Health Recommendations  Male Ages 50 - 64    Yearly exam:             See your health care provider every year in order to  o   Review health changes.   o   Discuss preventive care.    o   Review your medicines if your doctor has prescribed any.   Have a cholesterol test every 5 years, or more frequently if you are at risk for high cholesterol/heart disease.   Have a diabetes test (fasting glucose) every three years. If you are at risk for diabetes, you should have this test more often.   Have a colonoscopy at age 50, or have a yearly FIT test (stool test). These exams will check for colon cancer.    Talk with your health care provider about whether or not a prostate cancer screening test (PSA) is right for you.  You should be tested each year for STDs (sexually transmitted diseases), if you re at risk.     Shots: Get a flu shot each year. Get a tetanus shot every 10 years.     Nutrition:  Eat at least 5 servings of fruits and vegetables daily.   Eat whole-grain bread, whole-wheat pasta and brown rice instead of white grains and rice.   Get adequate Calcium and Vitamin D.     Lifestyle  Exercise for at least 150 minutes a week (30 minutes a day, 5 days a week). This will help you control your weight and prevent disease.   Limit alcohol to one drink per day.   No smoking.   Wear sunscreen to prevent skin cancer.   See your dentist every six months for an exam and cleaning.   See your eye doctor every 1 to 2 years.

## 2023-07-10 NOTE — Clinical Note
Please abstract the following data from this visit with this patient into the appropriate field in Epic:  Tests that can be patient reported without a hard copy:  Colonoscopy done on this date: 2/9/2015 (approximately), by this group: [facility], results were normal..   Other Tests found in the patient's chart through Chart Review/Care Everywhere:    Note to Abstraction: If this section is blank, no results were found via Chart Review/Care Everywhere.

## 2023-07-10 NOTE — PROGRESS NOTES
SUBJECTIVE:   CC: Bandar is an 53 year old who presents for preventative health visit.       7/10/2023     3:57 PM   Additional Questions   Roomed by Diane Lynne Schoenherr RN         7/10/2023     4:01 PM   Patient Reported Additional Medications   Patient reports taking the following new medications Loratidine     Healthy Habits:     Getting at least 3 servings of Calcium per day:  Yes    Bi-annual eye exam:  NO    Dental care twice a year:  Yes    Sleep apnea or symptoms of sleep apnea:  None    Diet:  Carbohydrate counting    Frequency of exercise:  1 day/week    Duration of exercise:  15-30 minutes    Taking medications regularly:  Yes    Medication side effects:  None    Additional concerns today:  No    Cardio - karate class, kickboxing -  Tolerating well.      Today's PHQ-9 Score:       7/10/2023     3:32 PM   PHQ-9 SCORE   PHQ-9 Total Score MyChart 8 (Mild depression)   PHQ-9 Total Score 8          6/5/2019     8:43 AM 11/18/2019     3:48 PM 7/26/2021     7:10 AM   BHARGAVI-7 SCORE   Total Score   2 (minimal anxiety)   Total Score 7 7 2             Hyperlipidemia Follow-Up  The 10-year ASCVD risk score (Pascale QUINTEROS, et al., 2019) is: 3.8%    Values used to calculate the score:      Age: 53 years      Sex: Male      Is Non- : No      Diabetic: No      Tobacco smoker: No      Systolic Blood Pressure: 111 mmHg      Is BP treated: No      HDL Cholesterol: 42 mg/dL      Total Cholesterol: 183 mg/dL     Are you regularly taking any medication or supplement to lower your cholesterol?   Yes- Lipitor    Are you having muscle aches or other side effects that you think could be caused by your cholesterol lowering medication?  No    Hypothyroidism Follow-up      Since last visit, patient describes the following symptoms: Weight loss with effort, no hair loss, no skin changes, no constipation, no loose stools  Hx thyroid cancer -  4 years post surgery. Ongoing surveillance at [facility].        Social History      Tobacco Use     Smoking status: Never     Smokeless tobacco: Never   Substance Use Topics     Alcohol use: No             7/10/2023     3:34 PM   Alcohol Use   Prescreen: >3 drinks/day or >7 drinks/week? No       Last PSA:   PSA   Date Value Ref Range Status   08/21/2020 0.95 0 - 4 ug/L Final     Comment:     Assay Method:  Chemiluminescence using Siemens Vista analyzer     Prostate Specific Antigen Screen   Date Value Ref Range Status   07/05/2023 1.05 0.00 - 3.50 ng/mL Final   07/26/2021 1.34 0.00 - 4.00 ug/L Final       Reviewed orders with patient. Reviewed health maintenance and updated orders accordingly - Yes  BP Readings from Last 3 Encounters:   07/10/23 111/77   04/28/22 118/72   07/26/21 126/86    Wt Readings from Last 3 Encounters:   07/10/23 102.2 kg (225 lb 6 oz)   04/28/22 103.9 kg (229 lb)   07/26/21 104.1 kg (229 lb 6.4 oz)                    Reviewed and updated as needed this visit by clinical staff   Tobacco  Allergies  Meds   Med Hx  Surg Hx  Fam Hx          Reviewed and updated as needed this visit by Provider   Tobacco  Allergies  Meds   Med Hx  Surg Hx  Fam Hx         Past Medical History:   Diagnosis Date     Depressive disorder 1987     H/O dysthymia      Heart disease 2014     Malignant neoplasm of thyroid gland (H)      Malignant neoplasm of thyroid gland (H)       Past Surgical History:   Procedure Laterality Date     ARTHROSCOPY KNEE       EYE SURGERY Left as a child     HEAD & NECK SURGERY  2019    Thyroid       Review of Systems   Constitutional: Negative for chills and fever.   HENT: Negative for congestion, ear pain, hearing loss and sore throat.    Eyes: Negative for pain and visual disturbance.   Respiratory: Negative for cough and shortness of breath.    Cardiovascular: Negative for chest pain, palpitations and peripheral edema.   Gastrointestinal: Negative for abdominal pain, constipation, diarrhea, heartburn, hematochezia and nausea.   Genitourinary: Negative  "for dysuria, frequency, genital sores, hematuria and urgency.   Musculoskeletal: Negative for arthralgias, joint swelling and myalgias.   Skin: Negative for rash.   Neurological: Negative for dizziness, weakness, headaches and paresthesias.   Psychiatric/Behavioral: Negative for mood changes. The patient is not nervous/anxious.          OBJECTIVE:   /77 (BP Location: Right arm, Patient Position: Sitting, Cuff Size: Adult Large)   Pulse 81   Temp 98.4  F (36.9  C) (Oral)   Resp 20   Ht 1.9 m (6' 2.8\")   Wt 102.2 kg (225 lb 6 oz)   SpO2 96%   BMI 28.32 kg/m      Physical Exam  GENERAL: alert, no distress and over weight  EYES: Eyes grossly normal to inspection, PERRL and conjunctivae and sclerae normal  HENT: ear canals and TM's normal, nose and mouth without ulcers or lesions  NECK: no adenopathy, no asymmetry, masses, or scars and thyroid normal to palpation  RESP: lungs clear to auscultation - no rales, rhonchi or wheezes  CV: regular rate and rhythm, normal S1 S2, no S3 or S4, no murmur, click or rub, no peripheral edema and peripheral pulses strong  ABDOMEN: soft, nontender, no hepatosplenomegaly, no masses and bowel sounds normal   (male): normal male genitalia without lesions or urethral discharge, no hernia  MS: no gross musculoskeletal defects noted, no edema  SKIN: no suspicious lesions or rashes  NEURO: Normal strength and tone, mentation intact and speech normal  PSYCH: mentation appears normal, affect normal/bright, fatigued, judgement and insight intact and appearance well groomed    Diagnostic Test Results:  Labs reviewed in Epic  Component      Latest Ref Rng 7/5/2023  8:06 AM   Sodium      136 - 145 mmol/L 140    Potassium      3.4 - 5.3 mmol/L 4.6    Chloride      98 - 107 mmol/L 104    Carbon Dioxide (CO2)      22 - 29 mmol/L 26    Anion Gap      7 - 15 mmol/L 10    Urea Nitrogen      6.0 - 20.0 mg/dL 20.6 (H)    Creatinine      0.67 - 1.17 mg/dL 1.03    Calcium      8.6 - 10.0 mg/dL " 9.3    Glucose      70 - 99 mg/dL 105 (H)    Alkaline Phosphatase      40 - 129 U/L 65    AST      0 - 45 U/L 32    ALT      0 - 70 U/L 47    Protein Total      6.4 - 8.3 g/dL 7.6    Albumin      3.5 - 5.2 g/dL 4.8    Bilirubin Total      <=1.2 mg/dL 0.6    GFR Estimate      >60 mL/min/1.73m2 87    WBC      4.0 - 11.0 10e3/uL 4.6    RBC Count      4.40 - 5.90 10e6/uL 5.20    Hemoglobin      13.3 - 17.7 g/dL 15.5    Hematocrit      40.0 - 53.0 % 45.2    MCV      78 - 100 fL 87    MCH      26.5 - 33.0 pg 29.8    MCHC      31.5 - 36.5 g/dL 34.3    RDW      10.0 - 15.0 % 12.1    Platelet Count      150 - 450 10e3/uL 208    Cholesterol      <200 mg/dL 183    Triglycerides      <150 mg/dL 119    HDL Cholesterol      >=40 mg/dL 42    LDL Cholesterol Calculated      <=100 mg/dL 117 (H)    Non HDL Cholesterol      <130 mg/dL 141 (H)    PSA Tumor Marker      0.00 - 3.50 ng/mL 1.05    TSH      0.30 - 4.20 uIU/mL 1.06       Legend:  (H) High      ASSESSMENT/PLAN:   (Z00.00) Routine general medical examination at a health care facility  (primary encounter diagnosis)  Comment: reviewed labs completed previously.   Plan: screening and preventative care discussed.     (E78.5) Hyperlipidemia LDL goal <130  Comment: taking lipitor at this time.   Plan: atorvastatin (LIPITOR) 10 MG tablet        Continue current dose of cholesterol lowering medication.     Malignant neoplasm thyroid  Hypothyroidism s/p surgery  Ongoing thyroid replacement.  Euthyroid on testing.  Ongoing surveillance [place].        (Z23) Need for Tdap vaccination  Comment: booster due.    Plan: TDAP VACCINE (Adacel, Boostrix)        Given.      Patient has been advised of split billing requirements and indicates understanding: Yes     COUNSELING:   Reviewed preventive health counseling, as reflected in patient instructions       Regular exercise       Healthy diet/nutrition       Vision screening       Immunizations    Vaccinated for: TDAP and Declined: Covid-19 and  "Zoster due to Concerns about side effects/safety               Colorectal cancer screening       Prostate cancer screening       Osteoporosis prevention/bone health      BMI:   Estimated body mass index is 28.32 kg/m  as calculated from the following:    Height as of this encounter: 1.9 m (6' 2.8\").    Weight as of this encounter: 102.2 kg (225 lb 6 oz).   Weight management plan: Discussed healthy diet and exercise guidelines      He reports that he has never smoked. He has never used smokeless tobacco.        Lisa Bravo MD  [facility]  Answers for HPI/ROS submitted by the patient on 7/10/2023  If you checked off any problems, how difficult have these problems made it for you to do your work, take care of things at home, or get along with other people?: Somewhat difficult  PHQ9 TOTAL SCORE: 8          Patient Instructions   Booster tetanus vaccine today.    Colonoscopy due in 2025.    Consider shingles vaccine at a later date.   This is a series of 2 vaccines, 2-6 months apart.  You will want to verify coverage with your insurance.  It may be covered better at the pharmacy and can have some side effects for a few days after the immunization.        "

## 2023-07-10 NOTE — NURSING NOTE
Prior to immunization administration, verified patients identity using patient s name and date of birth. Please see Immunization Activity for additional information. Yes    Screening Questionnaire for Adult Immunization    Are you sick today?   No   Do you have allergies to medications, food, a vaccine component or latex?   No   Have you ever had a serious reaction after receiving a vaccination?   No   Do you have a long-term health problem with heart, lung, kidney, or metabolic disease (e.g., diabetes), asthma, a blood disorder, no spleen, complement component deficiency, a cochlear implant, or a spinal fluid leak?  Are you on long-term aspirin therapy?   No   Do you have cancer, leukemia, HIV/AIDS, or any other immune system problem?   No   Do you have a parent, brother, or sister with an immune system problem?   No   In the past 3 months, have you taken medications that affect  your immune system, such as prednisone, other steroids, or anticancer drugs; drugs for the treatment of rheumatoid arthritis, Crohn s disease, or psoriasis; or have you had radiation treatments?   No   Have you had a seizure, or a brain or other nervous system problem?   No   During the past year, have you received a transfusion of blood or blood    products, or been given immune (gamma) globulin or antiviral drug?   No   For women: Are you pregnant or is there a chance you could become       pregnant during the next month?   N/A   Have you received any vaccinations in the past 4 weeks?   No     Immunization questionnaire answers were all negative.      Patient instructed to remain in clinic for 15 minutes afterwards, and to report any adverse reactions.     Screening performed by Diane L. Schoenherr, RN on 7/10/2023 at 5:22 PM.

## 2024-06-25 ENCOUNTER — OFFICE VISIT (OUTPATIENT)
Dept: FAMILY MEDICINE | Facility: CLINIC | Age: 54
End: 2024-06-25
Payer: COMMERCIAL

## 2024-06-25 VITALS
DIASTOLIC BLOOD PRESSURE: 91 MMHG | OXYGEN SATURATION: 98 % | HEART RATE: 60 BPM | RESPIRATION RATE: 16 BRPM | WEIGHT: 238 LBS | SYSTOLIC BLOOD PRESSURE: 138 MMHG | HEIGHT: 74 IN | BODY MASS INDEX: 30.54 KG/M2 | TEMPERATURE: 98.1 F

## 2024-06-25 DIAGNOSIS — M79.674 GREAT TOE PAIN, RIGHT: Primary | ICD-10-CM

## 2024-06-25 DIAGNOSIS — R03.0 ELEVATED BLOOD PRESSURE READING WITHOUT DIAGNOSIS OF HYPERTENSION: ICD-10-CM

## 2024-06-25 DIAGNOSIS — E89.0 POSTOPERATIVE HYPOTHYROIDISM: ICD-10-CM

## 2024-06-25 DIAGNOSIS — E78.5 HYPERLIPIDEMIA LDL GOAL <130: ICD-10-CM

## 2024-06-25 PROCEDURE — 84550 ASSAY OF BLOOD/URIC ACID: CPT | Performed by: PHYSICIAN ASSISTANT

## 2024-06-25 PROCEDURE — 99214 OFFICE O/P EST MOD 30 MIN: CPT | Performed by: PHYSICIAN ASSISTANT

## 2024-06-25 PROCEDURE — 36415 COLL VENOUS BLD VENIPUNCTURE: CPT | Performed by: PHYSICIAN ASSISTANT

## 2024-06-25 RX ORDER — INDOMETHACIN 50 MG/1
50 CAPSULE ORAL
Qty: 21 CAPSULE | Refills: 0 | Status: SHIPPED | OUTPATIENT
Start: 2024-06-25 | End: 2024-07-02

## 2024-06-25 ASSESSMENT — PATIENT HEALTH QUESTIONNAIRE - PHQ9
SUM OF ALL RESPONSES TO PHQ QUESTIONS 1-9: 5
10. IF YOU CHECKED OFF ANY PROBLEMS, HOW DIFFICULT HAVE THESE PROBLEMS MADE IT FOR YOU TO DO YOUR WORK, TAKE CARE OF THINGS AT HOME, OR GET ALONG WITH OTHER PEOPLE: SOMEWHAT DIFFICULT
SUM OF ALL RESPONSES TO PHQ QUESTIONS 1-9: 5

## 2024-06-25 NOTE — PROGRESS NOTES
"  Assessment & Plan     Great toe pain, right  Discussed with patient that this is consistent with gout especially that he has had recurrent flares in the past.  Has never had uric acid checked.  Will check today however discussed does not exclude diagnosis.  May benefit from preventative medication as he is having flareups a couple times a year and discussed potential cardiovascular impacts of elevated uric acid as well.  Can always see primary to discuss this further.  - Uric acid; Future  - indomethacin (INDOCIN) 50 MG capsule; Take 1 capsule (50 mg) by mouth 3 times daily (with meals) for 7 days    Elevated blood pressure reading without diagnosis of hypertension  Minimal elevation today, can be related to pain as well as recent OTC use    Hyperlipidemia LDL goal <130  Known condition taken into account for medical decision making, no current exacerbation or new concerns.  On atorvastatin.      Postoperative hypothyroidism  Known condition taken into account for medical decision making, no current exacerbation or new concerns.  On levothyroxine, follows with [facility]  Estimated body mass index is 30.56 kg/m  as calculated from the following:    Height as of this encounter: 1.88 m (6' 2\").    Weight as of this encounter: 108 kg (238 lb).             Subjective   Bandar is a 54 year old, presenting for the following health issues:  Foot Pain        6/25/2024     3:25 PM   Additional Questions   Roomed by luis   Accompanied by self         6/25/2024     3:25 PM   Patient Reported Additional Medications   Patient reports taking the following new medications no     History of Present Illness       Reason for visit:  Foot pain  Symptom onset:  3-7 days ago  Symptoms include:  Pain in right foot  Symptom intensity:  Moderate  Symptom progression:  Staying the same  Had these symptoms before:  Yes  Has tried/received treatment for these symptoms:  No  What makes it better:  Nsaids    He eats 0-1 servings of " "fruits and vegetables daily.He consumes 0 sweetened beverage(s) daily.He exercises with enough effort to increase his heart rate 20 to 29 minutes per day.  He exercises with enough effort to increase his heart rate 4 days per week.   He is taking medications regularly.         Right foot pain for about a week.  Has had episodes of this on average every quarter.  Typically only last for a few days.  Always affects his right first MTP joint.  Believes the first time he was seen for it they thought it could be gout and he recalls seeing someone for follow-up who told him it definitely was not gout.  He has never had his uric acid checked.  He has not noticed any pattern to flares.  Does not consume alcohol, drinks plenty of fluids.  Does not consume high purine foods regularly.  No injury.  No recent overuse.  Typically will alternate Tylenol and ibuprofen, has noticed some improvement today.            Objective    BP (!) 138/91 (BP Location: Left arm, Patient Position: Sitting, Cuff Size: Adult Regular)   Pulse 60   Temp 98.1  F (36.7  C) (Oral)   Resp 16   Ht 1.88 m (6' 2\")   Wt 108 kg (238 lb)   SpO2 98%   BMI 30.56 kg/m    Body mass index is 30.56 kg/m .  Physical Exam   GENERAL: alert and no distress  Right foot: Erythema, mild edema, and tenderness to first MTP joint.  Range of motion intact but does trigger some pain.  No other bony tenderness on exam.  Cap refill less than 2 seconds.  Left foot used as comparison unremarkable.            Signed Electronically by: Kim Barahona PA-C    "

## 2024-06-26 LAB — URATE SERPL-MCNC: 6.5 MG/DL (ref 3.4–7)

## 2024-08-16 DIAGNOSIS — E78.5 HYPERLIPIDEMIA LDL GOAL <130: ICD-10-CM

## 2024-08-17 RX ORDER — ATORVASTATIN CALCIUM 10 MG/1
10 TABLET, FILM COATED ORAL DAILY
Qty: 90 TABLET | Refills: 0 | Status: SHIPPED | OUTPATIENT
Start: 2024-08-17

## 2024-08-17 NOTE — TELEPHONE ENCOUNTER
Refill sent.  Follow up needed for additional refills - message on script.   Has appointment scheduled for October.   NAZIA

## 2024-09-07 ENCOUNTER — HEALTH MAINTENANCE LETTER (OUTPATIENT)
Age: 54
End: 2024-09-07

## 2024-10-16 SDOH — HEALTH STABILITY: PHYSICAL HEALTH: ON AVERAGE, HOW MANY DAYS PER WEEK DO YOU ENGAGE IN MODERATE TO STRENUOUS EXERCISE (LIKE A BRISK WALK)?: 3 DAYS

## 2024-10-16 SDOH — HEALTH STABILITY: PHYSICAL HEALTH: ON AVERAGE, HOW MANY MINUTES DO YOU ENGAGE IN EXERCISE AT THIS LEVEL?: 50 MIN

## 2024-10-16 ASSESSMENT — PATIENT HEALTH QUESTIONNAIRE - PHQ9
SUM OF ALL RESPONSES TO PHQ QUESTIONS 1-9: 5
SUM OF ALL RESPONSES TO PHQ QUESTIONS 1-9: 5
10. IF YOU CHECKED OFF ANY PROBLEMS, HOW DIFFICULT HAVE THESE PROBLEMS MADE IT FOR YOU TO DO YOUR WORK, TAKE CARE OF THINGS AT HOME, OR GET ALONG WITH OTHER PEOPLE: SOMEWHAT DIFFICULT

## 2024-10-16 ASSESSMENT — SOCIAL DETERMINANTS OF HEALTH (SDOH): HOW OFTEN DO YOU GET TOGETHER WITH FRIENDS OR RELATIVES?: ONCE A WEEK

## 2024-10-17 ENCOUNTER — OFFICE VISIT (OUTPATIENT)
Dept: FAMILY MEDICINE | Facility: CLINIC | Age: 54
End: 2024-10-17
Payer: COMMERCIAL

## 2024-10-17 ENCOUNTER — ANCILLARY PROCEDURE (OUTPATIENT)
Dept: GENERAL RADIOLOGY | Facility: CLINIC | Age: 54
End: 2024-10-17
Attending: FAMILY MEDICINE
Payer: COMMERCIAL

## 2024-10-17 VITALS
WEIGHT: 231.7 LBS | SYSTOLIC BLOOD PRESSURE: 132 MMHG | RESPIRATION RATE: 16 BRPM | OXYGEN SATURATION: 96 % | TEMPERATURE: 98.5 F | HEART RATE: 85 BPM | DIASTOLIC BLOOD PRESSURE: 88 MMHG | HEIGHT: 75 IN | BODY MASS INDEX: 28.81 KG/M2

## 2024-10-17 DIAGNOSIS — Z00.00 ROUTINE GENERAL MEDICAL EXAMINATION AT A HEALTH CARE FACILITY: Primary | ICD-10-CM

## 2024-10-17 DIAGNOSIS — M79.674 GREAT TOE PAIN, RIGHT: ICD-10-CM

## 2024-10-17 DIAGNOSIS — E55.9 VITAMIN D DEFICIENCY: ICD-10-CM

## 2024-10-17 DIAGNOSIS — Z12.5 SCREENING FOR PROSTATE CANCER: ICD-10-CM

## 2024-10-17 DIAGNOSIS — R53.83 FATIGUE, UNSPECIFIED TYPE: ICD-10-CM

## 2024-10-17 DIAGNOSIS — E89.0 POSTOPERATIVE HYPOTHYROIDISM: ICD-10-CM

## 2024-10-17 DIAGNOSIS — E78.5 HYPERLIPIDEMIA LDL GOAL <130: ICD-10-CM

## 2024-10-17 LAB
ALBUMIN SERPL BCG-MCNC: 5 G/DL (ref 3.5–5.2)
ALP SERPL-CCNC: 70 U/L (ref 40–150)
ALT SERPL W P-5'-P-CCNC: 77 U/L (ref 0–70)
ANION GAP SERPL CALCULATED.3IONS-SCNC: 12 MMOL/L (ref 7–15)
AST SERPL W P-5'-P-CCNC: 51 U/L (ref 0–45)
BILIRUB SERPL-MCNC: 0.9 MG/DL
BUN SERPL-MCNC: 17.8 MG/DL (ref 6–20)
CALCIUM SERPL-MCNC: 9.4 MG/DL (ref 8.8–10.4)
CHLORIDE SERPL-SCNC: 103 MMOL/L (ref 98–107)
CHOLEST SERPL-MCNC: 211 MG/DL
CREAT SERPL-MCNC: 1.07 MG/DL (ref 0.67–1.17)
EGFRCR SERPLBLD CKD-EPI 2021: 82 ML/MIN/1.73M2
ERYTHROCYTE [DISTWIDTH] IN BLOOD BY AUTOMATED COUNT: 12.1 % (ref 10–15)
FASTING STATUS PATIENT QL REPORTED: YES
FASTING STATUS PATIENT QL REPORTED: YES
GLUCOSE SERPL-MCNC: 111 MG/DL (ref 70–99)
HCO3 SERPL-SCNC: 24 MMOL/L (ref 22–29)
HCT VFR BLD AUTO: 46.8 % (ref 40–53)
HDLC SERPL-MCNC: 43 MG/DL
HGB BLD-MCNC: 16.7 G/DL (ref 13.3–17.7)
LDLC SERPL CALC-MCNC: 130 MG/DL
MCH RBC QN AUTO: 30.3 PG (ref 26.5–33)
MCHC RBC AUTO-ENTMCNC: 35.7 G/DL (ref 31.5–36.5)
MCV RBC AUTO: 85 FL (ref 78–100)
NONHDLC SERPL-MCNC: 168 MG/DL
PLATELET # BLD AUTO: 187 10E3/UL (ref 150–450)
POTASSIUM SERPL-SCNC: 4.7 MMOL/L (ref 3.4–5.3)
PROT SERPL-MCNC: 8.2 G/DL (ref 6.4–8.3)
PSA SERPL DL<=0.01 NG/ML-MCNC: 1.1 NG/ML (ref 0–3.5)
RBC # BLD AUTO: 5.51 10E6/UL (ref 4.4–5.9)
SODIUM SERPL-SCNC: 139 MMOL/L (ref 135–145)
TRIGL SERPL-MCNC: 188 MG/DL
VIT D+METAB SERPL-MCNC: 41 NG/ML (ref 20–50)
WBC # BLD AUTO: 5.8 10E3/UL (ref 4–11)

## 2024-10-17 PROCEDURE — G0103 PSA SCREENING: HCPCS | Performed by: FAMILY MEDICINE

## 2024-10-17 PROCEDURE — 80061 LIPID PANEL: CPT | Performed by: FAMILY MEDICINE

## 2024-10-17 PROCEDURE — 80053 COMPREHEN METABOLIC PANEL: CPT | Performed by: FAMILY MEDICINE

## 2024-10-17 PROCEDURE — 36415 COLL VENOUS BLD VENIPUNCTURE: CPT | Performed by: FAMILY MEDICINE

## 2024-10-17 PROCEDURE — 73660 X-RAY EXAM OF TOE(S): CPT | Mod: TC | Performed by: RADIOLOGY

## 2024-10-17 PROCEDURE — 82306 VITAMIN D 25 HYDROXY: CPT | Performed by: FAMILY MEDICINE

## 2024-10-17 PROCEDURE — 99396 PREV VISIT EST AGE 40-64: CPT | Performed by: FAMILY MEDICINE

## 2024-10-17 PROCEDURE — 85027 COMPLETE CBC AUTOMATED: CPT | Performed by: FAMILY MEDICINE

## 2024-10-17 PROCEDURE — 99213 OFFICE O/P EST LOW 20 MIN: CPT | Mod: 25 | Performed by: FAMILY MEDICINE

## 2024-10-17 NOTE — PROGRESS NOTES
"Preventive Care Visit  [facility]  Lisa Bravo MD, Family Medicine  Oct 17, 2024      Assessment & Plan     Routine general medical examination at a health care facility  Screening preventive care discussed    Fatigue, unspecified type  Thyroid testing was normal through his endocrinologist.  Additional evaluation including CBC and vitamin D levels.  If these are normal, would consider evaluation with the sleep clinic for evaluation for other causes of daytime somnolence.  - CBC with platelets; Future  - Vitamin D Deficiency; Future  - CBC with platelets  - Vitamin D Deficiency    Hyperlipidemia LDL goal <130  Lipid panel to assess adequacy of cholesterol control today.  Low-dose Lipitor is currently being taken.  We will reevaluate levels and determine appropriateness of current therapy.  Refill will be updated when results return.  - Lipid panel reflex to direct LDL Fasting; Future  - Comprehensive metabolic panel (BMP + Alb, Alk Phos, ALT, AST, Total. Bili, TP); Future  - Lipid panel reflex to direct LDL Fasting  - Comprehensive metabolic panel (BMP + Alb, Alk Phos, ALT, AST, Total. Bili, TP)    Postoperative hypothyroidism  Ongoing care through [facility].  Continue on the 137 mcg dosage now.    Screening for prostate cancer  Screening test  - PSA, screen; Future  - PSA, screen    Vitamin D deficiency  Reassess vitamin D level which has been reportedly low in the past.  - Vitamin D Deficiency; Future  - Vitamin D Deficiency    Great toe pain, right  Xray today without significant joint abnormality.  Monitor for repeat symptoms and assess for any precipitating events prior to symptoms.  NSAID use prn  - XR Toe Right G/E 2 Views; Future    Patient has been advised of split billing requirements and indicates understanding: Yes        BMI  Estimated body mass index is 28.96 kg/m  as calculated from the following:    Height as of this encounter: 1.905 m (6' 3\").    Weight as of this encounter: " 105.1 kg (231 lb 11.2 oz).   Weight management plan: Discussed healthy diet and exercise guidelines    Counseling  Appropriate preventive services were addressed with this patient via screening, questionnaire, or discussion as appropriate for fall prevention, nutrition, physical activity, Tobacco-use cessation, social engagement, weight loss and cognition.  Checklist reviewing preventive services available has been given to the patient.  Reviewed patient's diet, addressing concerns and/or questions.   He is at risk for lack of exercise and has been provided with information to increase physical activity for the benefit of his well-being.   The patient was instructed to see the dentist every 6 months.   He is at risk for psychosocial distress and has been provided with information to reduce risk.   The patient's PHQ-9 score is consistent with mild depression. He was provided with information regarding depression.       Patient Instructions   Labs today  If lab testing does not show cause for fatigue - consider sleep evaluation.  Refills will be updated when results return.    DASH diet attached for reference regarding dietary measures for BP control.      Xray today.  I will send final xray result when available.           Kiran Portillo is a 54 year old, presenting for the following:  Physical        10/17/2024     7:02 AM   Additional Questions   Roomed by Luly        Health Care Directive  Patient does not have a Health Care Directive or Living Will: Discussed advance care planning with patient; information given to patient to review.    HPI        Great toe pain - episodic yearly asya, uses ibuprofen and tylenol alternating with good results.          Hyperlipidemia Follow-Up    Are you regularly taking any medication or supplement to lower your cholesterol?   Yes- Lipitor  Are you having muscle aches or other side effects that you think could be caused by your cholesterol lowering medication?   No    Hypothyroidism Follow-up  Postoperative hypothyroidism after thyroid removal for cancer.  Followed through [facility] with regular lab and ultrasound follow-up.  Since last visit, patient describes the following symptoms: Weight stable, no hair loss, no skin changes, no constipation, no loose stools.  Fatigue and feeling cold.  Brain fog.         10/16/2024   General Health   How would you rate your overall physical health? Good   Feel stress (tense, anxious, or unable to sleep) Only a little      (!) STRESS CONCERN  - work, school       10/16/2024   Nutrition   Three or more servings of calcium each day? Yes   Diet: Regular (no restrictions)   How many servings of fruit and vegetables per day? (!) 2-3   How many sweetened beverages each day? 0-1            10/16/2024   Exercise   Days per week of moderate/strenous exercise 3 days   Average minutes spent exercising at this level 50 min    Kickboxing, hard in afternoons.          10/16/2024   Social Factors   Frequency of gathering with friends or relatives Once a week   Worry food won't last until get money to buy more No   Food not last or not have enough money for food? No   Do you have housing? (Housing is defined as stable permanent housing and does not include staying ouside in a car, in a tent, in an abandoned building, in an overnight shelter, or couch-surfing.) Yes   Are you worried about losing your housing? No   Lack of transportation? No   Unable to get utilities (heat,electricity)? No            10/16/2024   Fall Risk   Fallen 2 or more times in the past year? No   Trouble with walking or balance? No             10/16/2024   Dental   Dentist two times every year? (!) NO            10/16/2024   TB Screening   Were you born outside of the [place]? No          Today's PHQ-9 Score:       10/16/2024    10:03 AM   PHQ-9 SCORE   PHQ-9 Total Score MyChart 5 (Mild depression)   PHQ-9 Total Score 5         10/16/2024   Substance Use   Alcohol more than 3/day or more  "than 7/wk No   Do you use any other substances recreationally? No        Social History     Tobacco Use    Smoking status: Never    Smokeless tobacco: Never   Vaping Use    Vaping status: Never Used   Substance Use Topics    Alcohol use: No    Drug use: No           10/16/2024   STI Screening   New sexual partner(s) since last STI/HIV test? No      ASCVD Risk   The 10-year ASCVD risk score (Pascale QUINTEROS, et al., 2019) is: 6.1%    Values used to calculate the score:      Age: 54 years      Sex: Male      Is Non- : No      Diabetic: No      Tobacco smoker: No      Systolic Blood Pressure: 138 mmHg      Is BP treated: No      HDL Cholesterol: 42 mg/dL      Total Cholesterol: 183 mg/dL           Reviewed and updated as needed this visit by Provider   Tobacco  Allergies  Meds   Med Hx  Surg Hx  Fam Hx            Past Medical History:   Diagnosis Date    Depressive disorder 1987    H/O dysthymia     Heart disease 2014    Malignant neoplasm of thyroid gland (H)     Malignant neoplasm of thyroid gland (H)      Past Surgical History:   Procedure Laterality Date    ARTHROSCOPY KNEE      EYE SURGERY Left as a child    HEAD & NECK SURGERY  2019    Thyroid     BP Readings from Last 3 Encounters:   10/17/24 (!) 133/92   06/25/24 (!) 138/91   07/10/23 111/77    Wt Readings from Last 3 Encounters:   10/17/24 105.1 kg (231 lb 11.2 oz)   06/25/24 108 kg (238 lb)   07/10/23 102.2 kg (225 lb 6 oz)                      Review of Systems  Constitutional, HEENT, cardiovascular, pulmonary, GI, , musculoskeletal, neuro, skin, endocrine and psych systems are negative, except as otherwise noted.  Ongoing fatigue and cold sensation.  Did discuss this with his provider at [facility] and they did not feel it was thyroid related.       Objective    Exam  /88   Pulse 85   Temp 98.5  F (36.9  C) (Oral)   Resp 16   Ht 1.905 m (6' 3\")   Wt 105.1 kg (231 lb 11.2 oz)   SpO2 96%   BMI 28.96 kg/m     Estimated " "body mass index is 28.96 kg/m  as calculated from the following:    Height as of this encounter: 1.905 m (6' 3\").    Weight as of this encounter: 105.1 kg (231 lb 11.2 oz).    Physical Exam  GENERAL: alert, no distress, and over weight  EYES: Eyes grossly normal to inspection, PERRL and conjunctivae and sclerae normal  HENT: ear canals and TM's normal, nose and mouth without ulcers or lesions  NECK: no adenopathy, no asymmetry, masses, or scars  RESP: lungs clear to auscultation - no rales, rhonchi or wheezes  CV: regular rate and rhythm, normal S1 S2, no S3 or S4, no murmur, click or rub, no peripheral edema  ABDOMEN: soft, nontender, no hepatosplenomegaly, no masses and bowel sounds normal  MS: Full range of motion all extremities.  Location of previous pain in the toe is at the right MTP joint -no erythema or swelling is noted at the time.  Nontender on exam.  SKIN: no suspicious lesions or rashes  NEURO: Normal strength and tone, mentation intact and speech normal  PSYCH: mentation appears normal, affect normal/bright        Signed Electronically by: Lisa Bravo MD    Answers submitted by the patient for this visit:  Patient Health Questionnaire (Submitted on 10/16/2024)  If you checked off any problems, how difficult have these problems made it for you to do your work, take care of things at home, or get along with other people?: Somewhat difficult  PHQ9 TOTAL SCORE: 5          This chart was documented by provider using a voice activated software called Dragon in addition to manual typing. There may be vocabulary errors or other grammatical errors due to this.     "

## 2024-10-17 NOTE — PATIENT INSTRUCTIONS
Labs today  If lab testing does not show cause for fatigue - consider sleep evaluation.  Refills will be updated when results return.    DASH diet attached for reference regarding dietary measures for BP control.      Xray today.  I will send final xray result when available.        Patient Education   Preventive Care Advice   This is general advice given by our system to help you stay healthy. However, your care team may have specific advice just for you. Please talk to your care team about your preventive care needs.  Nutrition  Eat 5 or more servings of fruits and vegetables each day.  Try wheat bread, brown rice and whole grain pasta (instead of white bread, rice, and pasta).  Get enough calcium and vitamin D. Check the label on foods and aim for 100% of the RDA (recommended daily allowance).  Lifestyle  Exercise at least 150 minutes each week  (30 minutes a day, 5 days a week).  Do muscle strengthening activities 2 days a week. These help control your weight and prevent disease.  No smoking.  Wear sunscreen to prevent skin cancer.  Have a dental exam and cleaning every 6 months.  Yearly exams  See your health care team every year to talk about:  Any changes in your health.  Any medicines your care team has prescribed.  Preventive care, family planning, and ways to prevent chronic diseases.  Shots (vaccines)   HPV shots (up to age 26), if you've never had them before.  Hepatitis B shots (up to age 59), if you've never had them before.  COVID-19 shot: Get this shot when it's due.  Flu shot: Get a flu shot every year.  Tetanus shot: Get a tetanus shot every 10 years.  Pneumococcal, hepatitis A, and RSV shots: Ask your care team if you need these based on your risk.  Shingles shot (for age 50 and up)  General health tests  Diabetes screening:  Starting at age 35, Get screened for diabetes at least every 3 years.  If you are younger than age 35, ask your care team if you should be screened for diabetes.  Cholesterol  test: At age 39, start having a cholesterol test every 5 years, or more often if advised.  Bone density scan (DEXA): At age 50, ask your care team if you should have this scan for osteoporosis (brittle bones).  Hepatitis C: Get tested at least once in your life.  STIs (sexually transmitted infections)  Before age 24: Ask your care team if you should be screened for STIs.  After age 24: Get screened for STIs if you're at risk. You are at risk for STIs (including HIV) if:  You are sexually active with more than one person.  You don't use condoms every time.  You or a partner was diagnosed with a sexually transmitted infection.  If you are at risk for HIV, ask about PrEP medicine to prevent HIV.  Get tested for HIV at least once in your life, whether you are at risk for HIV or not.  Cancer screening tests  Cervical cancer screening: If you have a cervix, begin getting regular cervical cancer screening tests starting at age 21.  Breast cancer scan (mammogram): If you've ever had breasts, begin having regular mammograms starting at age 40. This is a scan to check for breast cancer.  Colon cancer screening: It is important to start screening for colon cancer at age 45.  Have a colonoscopy test every 10 years (or more often if you're at risk) Or, ask your provider about stool tests like a FIT test every year or Cologuard test every 3 years.  To learn more about your testing options, visit:   .  For help making a decision, visit:   https://bit.ly/ca79887.  Prostate cancer screening test: If you have a prostate, ask your care team if a prostate cancer screening test (PSA) at age 55 is right for you.  Lung cancer screening: If you are a current or former smoker ages 50 to 80, ask your care team if ongoing lung cancer screenings are right for you.  For informational purposes only. Not to replace the advice of your health care provider. Copyright   2023 [employer]. All rights reserved. Clinically reviewed by the [facility]. [employer] [id number] - REV 01/24.  Learning About Depression Screening  What is depression screening?  Depression screening is a way to see if you have depression symptoms. It may be done by a doctor or counselor. It's often part of a routine checkup. That's because your mental health is just as important as your physical health.  Depression is a mental health condition that affects how you feel, think, and act. You may:  Have less energy.  Lose interest in your daily activities.  Feel sad and grouchy for a long time.  Depression is very common. It affects people of all ages.  Many things can lead to depression. Some people become depressed after they have a stroke or find out they have a major illness like cancer or heart disease. The death of a loved one or a breakup may lead to depression. It can run in families. Most experts believe that a combination of inherited genes and stressful life events can cause it.  What happens during screening?  You may be asked to fill out a form about your depression symptoms. You and the doctor will discuss your answers. The doctor may ask you more questions to learn more about how you think, act, and feel.  What happens after screening?  If you have symptoms of depression, your doctor will talk to you about your options.  Doctors usually treat depression with medicines or counseling. Often, combining the two works best. Many people don't get help because they think that they'll get over the depression on their own. But people with depression may not get better unless they get treatment.  The cause of depression is not well understood. There may be many factors involved. But if you have depression, it's not your fault.  A serious symptom of depression is thinking about death or suicide. If you or someone you care about talks about this or about feeling hopeless, get help right away.  It's important to know that depression can be treated.  "Medicine, counseling, and self-care may help.  Where can you learn more?  Go to https://www.[employer].net/patiented  Enter T185 in the search box to learn more about \"Learning About Depression Screening.\"  Current as of: June 24, 2023  Content Version: 14.2 2024 [facility] [employer], [facility].   Care instructions adapted under license by your healthcare professional. If you have questions about a medical condition or this instruction, always ask your healthcare professional. [employer] disclaims any warranty or liability for your use of this information.       "

## 2024-10-20 NOTE — RESULT ENCOUNTER NOTE
"Your cholesterol levels are a little higher than previous. Continued use of the atorvastatin is recommended.   Your blood sugar is borderline elevated.  This is in the \"prediabetic\" range.  Exercise and limiting carbohydrate and sugars in diet can be helpful at preventing progression to diabetes.   Your kidney testing is normal.  Liver testing shows 2 mildly elevated enzyme levels.  This can occur with elevated cholesterol and blood sugar.  I would recommend monitoring this on a yearly basis.  Your prostate cancer screening is negative.  Your blood cell counts are normal.   Your vitamin D level is normal.  Continue your current vitamin D intake in diet and supplements.  Please call or MyChart message me if you have any questions.    PSK"

## 2024-11-24 DIAGNOSIS — E78.5 HYPERLIPIDEMIA LDL GOAL <130: ICD-10-CM

## 2024-11-25 RX ORDER — ATORVASTATIN CALCIUM 10 MG/1
10 TABLET, FILM COATED ORAL DAILY
Qty: 90 TABLET | Refills: 0 | Status: SHIPPED | OUTPATIENT
Start: 2024-11-25

## 2024-11-25 NOTE — TELEPHONE ENCOUNTER
Prescription approved per [facility] Refill Protocol.  Anne Marie Wasserman RN  [facility]

## 2025-02-25 DIAGNOSIS — E78.5 HYPERLIPIDEMIA LDL GOAL <130: ICD-10-CM

## 2025-02-25 RX ORDER — ATORVASTATIN CALCIUM 10 MG/1
10 TABLET, FILM COATED ORAL DAILY
Qty: 90 TABLET | Refills: 1 | Status: SHIPPED | OUTPATIENT
Start: 2025-02-25

## 2025-07-15 DIAGNOSIS — E78.5 HYPERLIPIDEMIA LDL GOAL <130: ICD-10-CM

## 2025-07-15 RX ORDER — ATORVASTATIN CALCIUM 10 MG/1
10 TABLET, FILM COATED ORAL DAILY
Qty: 90 TABLET | Refills: 0 | Status: SHIPPED | OUTPATIENT
Start: 2025-07-15